# Patient Record
Sex: FEMALE | Race: WHITE | Employment: FULL TIME | URBAN - METROPOLITAN AREA
[De-identification: names, ages, dates, MRNs, and addresses within clinical notes are randomized per-mention and may not be internally consistent; named-entity substitution may affect disease eponyms.]

---

## 2017-08-17 ENCOUNTER — HOSPITAL ENCOUNTER (EMERGENCY)
Age: 27
Discharge: HOME | End: 2017-08-17
Attending: EMERGENCY MEDICINE
Payer: COMMERCIAL

## 2017-08-17 VITALS
SYSTOLIC BLOOD PRESSURE: 113 MMHG | BODY MASS INDEX: 31.89 KG/M2 | RESPIRATION RATE: 16 BRPM | HEART RATE: 112 BPM | DIASTOLIC BLOOD PRESSURE: 81 MMHG | TEMPERATURE: 97.9 F | HEIGHT: 66 IN | WEIGHT: 198.41 LBS | OXYGEN SATURATION: 97 %

## 2017-08-17 DIAGNOSIS — M79.89 SWELLING OF RIGHT HAND: ICD-10-CM

## 2017-08-17 DIAGNOSIS — L50.1 IDIOPATHIC URTICARIA: Primary | ICD-10-CM

## 2017-08-17 PROCEDURE — 74011250636 HC RX REV CODE- 250/636: Performed by: PHYSICIAN ASSISTANT

## 2017-08-17 PROCEDURE — 74011250637 HC RX REV CODE- 250/637: Performed by: PHYSICIAN ASSISTANT

## 2017-08-17 PROCEDURE — 96372 THER/PROPH/DIAG INJ SC/IM: CPT

## 2017-08-17 PROCEDURE — 99283 EMERGENCY DEPT VISIT LOW MDM: CPT

## 2017-08-17 RX ORDER — HYDROXYZINE PAMOATE 25 MG/1
25 CAPSULE ORAL
Qty: 20 CAP | Refills: 0 | OUTPATIENT
Start: 2017-08-17 | End: 2021-08-28

## 2017-08-17 RX ORDER — DIPHENHYDRAMINE HYDROCHLORIDE 50 MG/ML
50 INJECTION, SOLUTION INTRAMUSCULAR; INTRAVENOUS
Status: COMPLETED | OUTPATIENT
Start: 2017-08-17 | End: 2017-08-17

## 2017-08-17 RX ORDER — PREDNISONE 20 MG/1
20 TABLET ORAL
COMMUNITY
End: 2021-08-28

## 2017-08-17 RX ORDER — CETIRIZINE HCL 10 MG
10 TABLET ORAL
Qty: 30 TAB | Refills: 0 | OUTPATIENT
Start: 2017-08-17 | End: 2021-08-28

## 2017-08-17 RX ORDER — PHENTERMINE HYDROCHLORIDE 37.5 MG/1
37.5 TABLET ORAL
COMMUNITY
End: 2021-08-28

## 2017-08-17 RX ORDER — EPINEPHRINE 0.3 MG/.3ML
0.3 INJECTION SUBCUTANEOUS
COMMUNITY
End: 2021-08-28

## 2017-08-17 RX ORDER — BUPROPION HYDROCHLORIDE 150 MG/1
150 TABLET, EXTENDED RELEASE ORAL 2 TIMES DAILY
COMMUNITY
End: 2021-08-28

## 2017-08-17 RX ORDER — FAMOTIDINE 20 MG/1
20 TABLET, FILM COATED ORAL
Status: COMPLETED | OUTPATIENT
Start: 2017-08-17 | End: 2017-08-17

## 2017-08-17 RX ORDER — EPINEPHRINE 0.3 MG/.3ML
0.3 INJECTION SUBCUTANEOUS
Qty: 2 SYRINGE | Refills: 0 | OUTPATIENT
Start: 2017-08-17 | End: 2021-08-28

## 2017-08-17 RX ORDER — DIPHENHYDRAMINE HCL 25 MG
100 CAPSULE ORAL
COMMUNITY
End: 2021-08-28

## 2017-08-17 RX ORDER — HYDROXYZINE 25 MG/1
25 TABLET, FILM COATED ORAL
Status: COMPLETED | OUTPATIENT
Start: 2017-08-17 | End: 2017-08-17

## 2017-08-17 RX ADMIN — HYDROXYZINE HYDROCHLORIDE 25 MG: 25 TABLET ORAL at 15:58

## 2017-08-17 RX ADMIN — FAMOTIDINE 20 MG: 20 TABLET, FILM COATED ORAL at 15:58

## 2017-08-17 RX ADMIN — DIPHENHYDRAMINE HYDROCHLORIDE 50 MG: 50 INJECTION, SOLUTION INTRAMUSCULAR; INTRAVENOUS at 15:59

## 2017-08-17 NOTE — DISCHARGE INSTRUCTIONS
We hope that we have addressed all of your medical concerns. The examination and treatment you received in the Emergency Department were for an emergent problem and were not intended as complete care. It is important that you follow up with your healthcare provider(s) for ongoing care. If your symptoms worsen or do not improve as expected, and you are unable to reach your usual health care provider(s), you should return to the Emergency Department. Today's healthcare is undergoing tremendous change, and patient satisfaction surveys are one of the many tools to assess the quality of medical care. You may receive a survey from the [employer] regarding your experience in the Emergency Department. I hope that your experience has been completely positive, particularly the medical care that I provided. As such, please participate in the survey; anything less than excellent does not meet my expectations or intentions. [facility], [facility] and [address] [place] [address] participate in nationally recognized quality of care measures. If your blood pressure is greater than 120/80, as reported below, we urge that you seek medical care to address the potential of high blood pressure, commonly known as hypertension. Hypertension can be hereditary or can be caused by certain medical conditions, pain, stress, or \"white coat syndrome. \"       Please make an appointment with your health care provider(s) for follow up of your Emergency Department visit. VITALS:   Patient Vitals for the past 8 hrs:   Temp Pulse Resp BP SpO2   08/17/17 1524 97.9 °F (36.6 °C) (!) 112 16 119/71 100 %          Thank you for allowing us to provide you with medical care today. We realize that you have many choices for your emergency care needs. Please choose us in the future for any continued health care needs.       Regards,           Sofya Ratliff PA-C    [facility], [address] [facility]. Office: [phone]             Hives: Care Instructions  Your Care Instructions  Hives are raised, red, itchy patches of skin. They are also called wheals or welts. They usually have red borders and pale centers. Hives range in size from ¼ inch to 3 inches or more across. They may seem to move from place to place on the skin. Several hives may form a large area of raised, red skin. You can get hives after an insect sting, after taking medicine or eating certain foods, or because of infection or stress. Other causes include plants, things you breathe in, makeup, heat, cold, sunlight, and latex. You cannot spread hives to other people. Hives may last a few minutes or a few days, but a single spot may last less than 36 hours. Follow-up care is a key part of your treatment and safety. Be sure to make and go to all appointments, and call your doctor if you are having problems. It's also a good idea to know your test results and keep a list of the medicines you take. How can you care for yourself at home? · Avoid whatever you think may have caused your hives, such as a certain food or medicine. However, you may not know the cause. · Put a cool, wet towel on the area to relieve itching. · Take an over-the-counter antihistamine, such as diphenhydramine (Benadryl), cetirizine (Zyrtec), or loratadine (Claritin), to help stop the hives and calm the itching. Read and follow directions on the label. These medicines can make you feel sleepy. Do not drive while using them. · Stay away from strong soaps, detergents, and chemicals. These can make itching worse. When should you call for help? Call 911 anytime you think you may need emergency care. For example, call if:  · You have symptoms of a severe allergic reaction. These may include:  ¨ Sudden raised, red areas (hives) all over your body. ¨ Swelling of the throat, mouth, lips, or tongue. ¨ Trouble breathing. ¨ Passing out (losing consciousness).  Or you may feel very lightheaded or suddenly feel weak, confused, or restless. Call your doctor now or seek immediate medical care if:  · You have symptoms of an allergic reaction, such as:  ¨ A rash or hives (raised, red areas on the skin). ¨ Itching. ¨ Swelling. ¨ Belly pain, nausea, or vomiting. · You get hives after you start a new medicine. · Hives have not gone away after 24 hours. Watch closely for changes in your health, and be sure to contact your doctor if:  · You do not get better as expected. Where can you learn more? Go to http://get-aisha.info/. Enter V463 in the search box to learn more about \"Hives: Care Instructions. \"  Current as of: March 20, 2017  Content Version: 11.3  © 4251-1738 [employer]. Care instructions adapted under license by [employer] (which disclaims liability or warranty for this information). If you have questions about a medical condition or this instruction, always ask your healthcare professional. [place] [address] any warranty or liability for your use of this information.

## 2017-08-17 NOTE — ED TRIAGE NOTES
Patient presents with 2 day history of facial swelling, hand swelling, and hives. Seen at [facility] yesterday -- has not had any relief of symptoms from steroids and Benadryl.

## 2017-08-17 NOTE — ED PROVIDER NOTES
HPI Comments: Augusto Benites is a 32 y.o. female with no significant PMH presents to emergency room ambulatory with friend c/o urticaria x 3 days. She states on 8/15 in the evening her left upper lip and BL inner thighs were itchy and swollen and took 1 Benadryl and went to sleep. States the next morning the urticaria progressed to full body and she went to [facility] yesterday where they gave her Prednisone 60mg, and an Epi Pen while in the office and states the sx's subsided. She denies having any tongue swelling, airway involvement, wheezing, SOB. She took Prednisone 60mg this morning and has been taking Benadryl 50mg q4h, last dose was 1pm. She states her right hand began to swell at work today and so she decided to be evaluated. She takes phentermine 37.5mg BID which she has been taking for \"years\" and was started on Wellbutrin XL 150mg daily on 7/27, denies tick bites, insect bites, new soaps or detergents, new lotions/perfumes, bath soap. She further denies F/C, N/V/D, CP, SOB, cough. Denies pregnancy chance and states LMP was 8/11. She was also given a Rx for an Epi Pen from [facility] and Prednisone 60mg x 5 days. PCP: No primary care provider on file. Social hx- no tobacco, + ETOH; has a desk job    The patient has no other complaints at this time. Patient is a 32 y.o. female presenting with hand swelling and allergic reaction. The history is provided by the patient. Hand Swelling    Pertinent negatives include no numbness, no back pain and no neck pain. Allergic Reaction    Pertinent negatives include no nausea, no vomiting, no confusion and no shortness of breath. No past medical history on file. No past surgical history on file. No family history on file. Social History     Social History    Marital status: N/A     Spouse name: N/A    Number of children: N/A    Years of education: N/A     Occupational History    Not on file.      Social History Main Topics    Smoking status: Not on file    Smokeless tobacco: Not on file    Alcohol use Not on file    Drug use: Not on file    Sexual activity: Not on file     Other Topics Concern    Not on file     Social History Narrative         ALLERGIES: Pcn [penicillins]    Review of Systems   Constitutional: Negative. Negative for activity change, chills, fatigue and unexpected weight change. Respiratory: Negative for cough, chest tightness, shortness of breath and wheezing. Cardiovascular: Negative. Negative for chest pain and palpitations. Gastrointestinal: Negative. Negative for abdominal pain, diarrhea, nausea and vomiting. Genitourinary: Negative. Negative for dysuria, flank pain, frequency and hematuria. Musculoskeletal: Negative. Negative for arthralgias, back pain, neck pain and neck stiffness. Skin: Positive for rash. Negative for color change. Neurological: Negative. Negative for dizziness, numbness and headaches. Psychiatric/Behavioral: Negative. Negative for confusion. All other systems reviewed and are negative. Vitals:    08/17/17 1524   BP: 119/71   Pulse: (!) 112   Resp: 16   Temp: 97.9 °F (36.6 °C)   SpO2: 100%   Weight: 90 kg (198 lb 6.6 oz)   Height: 5' 6\" (1.676 m)            Physical Exam   Constitutional: She is oriented to person, place, and time. She appears well-developed and well-nourished. She is active. Non-toxic appearance. No distress. HENT:   Head: Normocephalic and atraumatic. Eyes: Conjunctivae are normal. Pupils are equal, round, and reactive to light. Right eye exhibits no discharge. Left eye exhibits no discharge. Mild soft tissue swelling to BL upper lips. Neck: Normal range of motion and full passive range of motion without pain. Neck supple. No tracheal tenderness present. Cardiovascular: Normal rate, regular rhythm, normal heart sounds, intact distal pulses and normal pulses. Exam reveals no gallop and no friction rub.     No murmur heard.  Pulmonary/Chest: Effort normal and breath sounds normal. No respiratory distress. She has no wheezes. She has no rales. She exhibits no tenderness. Abdominal: Soft. Bowel sounds are normal. She exhibits no distension. There is no tenderness. There is no rebound and no guarding. Musculoskeletal: Normal range of motion. She exhibits no edema or tenderness. Neurological: She is alert and oriented to person, place, and time. She has normal strength. No cranial nerve deficit or sensory deficit. Coordination normal.   Skin: Skin is warm, dry and intact. No abrasion and no rash noted. She is not diaphoretic. No erythema. R upper lip with mild soft tissue swelling. Blancheable urticaria to anterior, lateral torso bilaterally, sparing the back. Urticaria also noted to posterior thighs BL, and swelling to right hand. See attached photos. Psychiatric: She has a normal mood and affect. Her speech is normal and behavior is normal. Cognition and memory are normal.   Nursing note and vitals reviewed. MDM  Number of Diagnoses or Management Options  Diagnosis management comments:   Ddx: idiopathic urticaria, allergic reaction, anaphylaxis       Amount and/or Complexity of Data Reviewed  Review and summarize past medical records: yes    Patient Progress  Patient progress: stable    ED Course       Procedures      I discussed patient's PMH, exam findings as well as careplan with the ER attending who agrees with care plan. Dr. Sayda Cisneros also saw and examined patient. Tatum Arias PA-C     4:45pm  Patient re-assessed, still c/o R hand pain but otherwise feeling better. Ice applied to right hand and it is elevated. Still no airway involvement, voice still unchanged, normal.  Tatum Arias PA-C    5:15pm   Patient re-assessed. Urticaria lessened. Lungs still CTA, no airway or intraoral involvement.  Will have her continue Prednisone 60mg daily as instructed, also discussed indications for using the Epi Pen Rx'd by [facility]. Will also add hydroxyzine and Zyrtec and recommend close f/u with a primary care provider. Return precautions discussed and advised to return to ER if worse. Dhara Montalvo PA-C       DISCHARGE NOTE:  5:29 PM  The patient's results have been reviewed with them and/or available family. Patient and/or family verbally conveyed their understanding and agreement of the patient's signs, symptoms, diagnosis, treatment and prognosis and additionally agree to follow up as recommended in the discharge instructions or to return to the Emergency Room should their condition change prior to their follow-up appointment. The patient/family verbally agrees with the care-plan and verbally conveys that all of their questions have been answered. The discharge instructions have also been provided to the patient and/or family with some educational information regarding the patient's diagnosis as well a list of reasons why the patient would want to return to the ER prior to their follow-up appointment, should their condition change.

## 2017-08-23 ENCOUNTER — TELEPHONE (OUTPATIENT)
Dept: INTERNAL MEDICINE CLINIC | Age: 27
End: 2017-08-23

## 2017-08-23 NOTE — TELEPHONE ENCOUNTER
Reached out to patient x2 to schedule New Patient appointment to establish care. Contact phone number on file not in service at time of call.

## 2021-04-12 LAB
ANTIBODY SCREEN, EXTERNAL: NEGATIVE
CHLAMYDIA, EXTERNAL: NEGATIVE
HBSAG, EXTERNAL: NEGATIVE
HIV, EXTERNAL: NORMAL
N. GONORRHEA, EXTERNAL: NEGATIVE
RPR, EXTERNAL: NORMAL
RUBELLA, EXTERNAL: NORMAL
TYPE, ABO & RH, EXTERNAL: NORMAL

## 2021-08-17 ENCOUNTER — OFFICE VISIT (OUTPATIENT)
Dept: SURGERY | Age: 31
End: 2021-08-17
Payer: COMMERCIAL

## 2021-08-17 VITALS
WEIGHT: 232 LBS | HEIGHT: 65 IN | HEART RATE: 99 BPM | BODY MASS INDEX: 38.65 KG/M2 | SYSTOLIC BLOOD PRESSURE: 95 MMHG | DIASTOLIC BLOOD PRESSURE: 59 MMHG

## 2021-08-17 DIAGNOSIS — N64.9 LESION OF LEFT NIPPLE: Primary | ICD-10-CM

## 2021-08-17 PROCEDURE — 99241 PR OFFICE CONSULTATION NEW/ESTAB PATIENT 15 MIN: CPT | Performed by: SURGERY

## 2021-08-17 PROCEDURE — 10120 INC&RMVL FB SUBQ TISS SMPL: CPT | Performed by: SURGERY

## 2021-08-17 NOTE — PROGRESS NOTES
HISTORY OF PRESENT ILLNESS  Aida Dominique is a 27 y.o. female. HPI   NEW patient consult referred by Dr. Anahi Evangelista for a nipple ring that is stuck in her LEFT nipple. 3 weeks ago one end disappeared in the nipple. Pt is pregnant and she would like to have it removed. Pain 3/10. Some white discharge. Ob/Gyn was unable to get the piercing out this morning. Family History   Problem Relation Age of Onset    Cancer Maternal Grandmother     Cancer Paternal Grandfather     Cancer Cousin      Social History     Tobacco Use    Smoking status: Never Smoker    Smokeless tobacco: Never Used   Substance Use Topics    Alcohol use: Yes      Prior to Admission medications    Medication Sig Start Date End Date Taking? Authorizing Provider   prenatal vit-iron fumarate-fa 27 mg iron- 0.8 mg tab tablet Take 1 Tablet by mouth daily. Yes Provider, Historical   phentermine (ADIPEX-P) 37.5 mg tablet Take 37.5 mg by mouth every morning. Domonique Powell MD   buPROPion SR (WELLBUTRIN SR) 150 mg SR tablet Take 150 mg by mouth two (2) times a day. Domonique Powell MD   predniSONE (DELTASONE) 20 mg tablet Take 20 mg by mouth daily (with breakfast). Domonique Powell MD   diphenhydrAMINE (DIPHEDRYL) 25 mg capsule Take 100 mg by mouth every six (6) hours as needed. Domonique Powell MD   EPINEPHrine (EPIPEN) 0.3 mg/0.3 mL injection 0.3 mg by IntraMUSCular route once as needed. Domonique Powell MD   hydrOXYzine pamoate (VISTARIL) 25 mg capsule Take 1 Cap by mouth three (3) times daily as needed for Itching. 8/17/17   Manish Agustin PA-C   cetirizine (ZYRTEC) 10 mg tablet Take 1 Tab by mouth nightly. 8/17/17   Manish Agustin PA-C   EPINEPHrine (EPIPEN) 0.3 mg/0.3 mL injection 0.3 mL by IntraMUSCular route once as needed for up to 1 dose. 8/17/17   Manish Agustin PA-C      Allergies   Allergen Reactions    Pcn [Penicillins] Rash              Review of Systems   Cardiovascular: Positive for leg swelling.    Musculoskeletal: Positive for back pain and joint pain. Neurological: Positive for headaches. Psychiatric/Behavioral: Positive for depression. The patient is nervous/anxious and has insomnia. All other systems reviewed and are negative. Physical Exam  Chest:      Breasts: Breasts are symmetrical.         Right: No inverted nipple, mass, nipple discharge, skin change or tenderness. Left: No inverted nipple, mass, nipple discharge, skin change or tenderness. Comments: Lateral barbell is under the skin. Incision at the lateral edge  Lymphadenopathy:      Upper Body:      Right upper body: No supraclavicular or axillary adenopathy. Left upper body: No supraclavicular or axillary adenopathy. Procedure:  I attempted to removed the barbell piercing from the lateral side, but was unable to grasp it. I grasped the medial barbell with a needle  and was able to pull the piercing out from the medial aspect. Dressed with bacitracin and a telfa bandaid. ASSESSMENT and PLAN    ICD-10-CM ICD-9-CM    1. Lesion of left nipple  N64.9 611.9      Total time spent with patient: 20 minutes     LEFT nipple piercing removed  Discussed the possibility that breast feeding could be compromised due to the piercing. Pt will see how it goes.

## 2021-08-28 ENCOUNTER — HOSPITAL ENCOUNTER (EMERGENCY)
Age: 31
Discharge: HOME | End: 2021-08-28
Admitting: OBSTETRICS & GYNECOLOGY
Payer: COMMERCIAL

## 2021-08-28 VITALS
RESPIRATION RATE: 16 BRPM | TEMPERATURE: 98.5 F | SYSTOLIC BLOOD PRESSURE: 121 MMHG | DIASTOLIC BLOOD PRESSURE: 74 MMHG | HEART RATE: 102 BPM

## 2021-08-28 DIAGNOSIS — Z3A.27 27 WEEKS GESTATION OF PREGNANCY: Primary | ICD-10-CM

## 2021-08-28 DIAGNOSIS — R10.9 ABDOMINAL PAIN AFFECTING PREGNANCY: ICD-10-CM

## 2021-08-28 DIAGNOSIS — O26.899 ABDOMINAL PAIN AFFECTING PREGNANCY: ICD-10-CM

## 2021-08-28 LAB
APPEARANCE UR: CLEAR
BACTERIA URNS QL MICRO: NEGATIVE /HPF
BILIRUB UR QL: NEGATIVE
CLUE CELLS VAG QL WET PREP: NORMAL
COLOR UR: NORMAL
EPITH CASTS URNS QL MICRO: NORMAL /LPF
GLUCOSE UR STRIP.AUTO-MCNC: NEGATIVE MG/DL
HGB UR QL STRIP: NEGATIVE
HYALINE CASTS URNS QL MICRO: NORMAL /LPF (ref 0–5)
KETONES UR QL STRIP.AUTO: NEGATIVE MG/DL
KOH PREP SPEC: NORMAL
LEUKOCYTE ESTERASE UR QL STRIP.AUTO: NEGATIVE
NITRITE UR QL STRIP.AUTO: NEGATIVE
PH UR STRIP: 6 [PH] (ref 5–8)
PROT UR STRIP-MCNC: NEGATIVE MG/DL
RBC #/AREA URNS HPF: NORMAL /HPF (ref 0–5)
SERVICE CMNT-IMP: NORMAL
SP GR UR REFRACTOMETRY: 1.01 (ref 1–1.03)
T VAGINALIS VAG QL WET PREP: NORMAL
UA: UC IF INDICATED,UAUC: NORMAL
UROBILINOGEN UR QL STRIP.AUTO: 0.2 EU/DL (ref 0.2–1)
WBC URNS QL MICRO: NORMAL /HPF (ref 0–4)

## 2021-08-28 PROCEDURE — 99283 EMERGENCY DEPT VISIT LOW MDM: CPT

## 2021-08-28 PROCEDURE — 81001 URINALYSIS AUTO W/SCOPE: CPT

## 2021-08-28 PROCEDURE — 87210 SMEAR WET MOUNT SALINE/INK: CPT

## 2021-08-28 RX ORDER — METRONIDAZOLE 500 MG/1
500 TABLET ORAL 2 TIMES DAILY
Qty: 14 TABLET | Refills: 0 | Status: SHIPPED | OUTPATIENT
Start: 2021-08-28 | End: 2021-09-04

## 2021-08-29 NOTE — DISCHARGE INSTRUCTIONS
Patient Education        Weeks 26 to 30 of Your Pregnancy: Care Instructions  Overview     You are now entering your last trimester of pregnancy. Your baby is growing quickly. Jose Nielsen probably feel your baby moving around more often. Your doctor may ask you to count your baby's kicks. Your back may ache as your body gets used to your baby's size and length. If you haven't already had the Tdap shot during this pregnancy, talk to your doctor about getting it. It will help protect your  against pertussis infection. During this time, it's important to take care of yourself and pay attention to what your body needs. If you feel sexual, you can explore ways to be close with your partner that match your comfort and desire. Follow-up care is a key part of your treatment and safety. Be sure to make and go to all appointments, and call your doctor if you are having problems. It's also a good idea to know your test results and keep a list of the medicines you take. How can you care for yourself at home? Take it easy at work  · Take frequent breaks. If possible, stop working when you are tired, and rest during your lunch hour. · Take bathroom breaks every 2 hours. · Change positions often. If you sit for long periods, stand up and walk around. · When you stand for a long time, keep one foot on a low stool with your knee bent. After standing a lot, sit with your feet up. · Avoid fumes, chemicals, and tobacco smoke. Be sexual in your own way  · Having sex during pregnancy is okay, unless your doctor tells you not to. · You may be very interested in sex, or you may have no interest at all. · Your growing belly can make it hard to find a good position during intercourse. Castine and explore. · You may get cramps in your uterus when your partner touches your breasts. · A back rub may relieve the backache or cramps that sometimes follow orgasm. Learn about  labor  · Watch for signs of  labor. You may be going into labor if:  ? You have menstrual-like cramps, with or without nausea. ? You have about 6 or more contractions in 1 hour, even after you have had a glass of water and are resting. ? You have a low, dull backache that does not go away when you change your position. ? You have pain or pressure in your pelvis that comes and goes in a pattern. ? You have intestinal cramping or flu-like symptoms, with or without diarrhea.  ? You notice an increase or change in your vaginal discharge. Discharge may be heavy, mucus-like, watery, or streaked with blood. ? Your water breaks. · If you think you have  labor:  ? Drink 2 or 3 glasses of water or juice. Not drinking enough fluids can cause contractions. ? Stop what you are doing, and empty your bladder. Then lie down on your left side for at least 1 hour. ? While lying on your side, find your breast bone. Put your fingers in the soft spot just below it. Move your fingers down toward your belly button to find the top of your uterus. Check to see if it is tight. ? Contractions can be weak or strong. Record your contractions for an hour. Time a contraction from the start of one contraction to the start of the next one.  ? Single or several strong contractions without a pattern are called Tom-Nguyen contractions. They are practice contractions but not the start of labor. They often stop if you change what you are doing. ? Call your doctor if you have regular contractions. Where can you learn more? Go to http://www.gray.com/  Enter P542 in the search box to learn more about \"Weeks 26 to 30 of Your Pregnancy: Care Instructions. \"  Current as of: 2020               Content Version: 12.8  © 4538-1611 [employer]. Care instructions adapted under license by [employer] (which disclaims liability or warranty for this information).  If you have questions about a medical condition or this instruction, always ask your healthcare professional. [place] [address] any warranty or liability for your use of this information.

## 2021-08-29 NOTE — ED TRIAGE NOTES
2210 Patient arrived in [facility] complaining of contractions she stated were every 4 minutes apart, starting at 7 pm. Placed on monitors, history obtained. CNM notified patient ready to be seen    [phone] CNM at bedside to evaluate patient. SVE closed/thick/high. UA and cultures sent. Patient resting comfortably at this time. [address] at bedside, reassessed cervix, unchanged. Patient educated by CNM on antibiotics and to be discharged at this time. Patient verbalized understanding of education provided and agrees with plan. 6924 Discharge instructions given to the patient, patient understands and will follow up with MD for scheduled appointment this week. Patient discharged at this time.

## 2021-08-29 NOTE — ED PROVIDER NOTES
ANNI History and Physical    Patient is a 27 y.o.  at 27w1d with lei 21 who presents to the [facility] with c/o abdominal pain \"tightening\" at 2210. She reports it started around 1930 this evening and they are about q5 minutes. Rates them 5/10. Appears comfortable in bed and smiling. Reports she was outside standing up at a baby shower all day. Reports she did drink a lot of water today but was on her feet all day. She reports good FM. Denies VB, LOF, n/v/d, fever, cough, sore throat, SOB/difficulty breathing, loss of taste/smell, exposure to anyone with COVID, h/a, changes in vision, RUQ/epigastric pain. Also reports intercourse this morning. She reports prenatal care with Dr. 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 No pertinent surgical history. Family History:   Problem Relation Age of Onset    Cancer Maternal Grandmother     Cancer Paternal Grandfather     Cancer Cousin        Social History     Socioeconomic History    Marital status: SINGLE     Spouse name: Not on file    Number of children: Not on file    Years of education: Not on file    Highest education level: Not on file   Occupational History    Not on file   Tobacco Use    Smoking status: Never Smoker    Smokeless tobacco: Never Used   Vaping Use    Vaping Use: Never used   Substance and Sexual Activity    Alcohol use:  Yes    Drug use: Never    Sexual activity: Not on file   Other Topics Concern     Service Not Asked    Blood Transfusions Not Asked    Caffeine Concern Not Asked    Occupational Exposure Not Asked    Hobby Hazards Not Asked    Sleep Concern Not Asked    Stress Concern Not Asked    Weight Concern Not Asked    Special Diet Not Asked    Back Care Not Asked    Exercise Not Asked    Bike Helmet Not Asked  Seat Belt Not Asked    Self-Exams Not Asked   Social History Narrative    Not on file     Social Determinants of Health     Financial Resource Strain:     Difficulty of Paying Living Expenses:    Food Insecurity:     Worried About Running Out of Food in the Last Year:     [address] in the Last Year:    Transportation Needs:     Lack of Transportation (Medical):  Lack of Transportation (Non-Medical):    Physical Activity:     Days of Exercise per Week:     Minutes of Exercise per Session:    Stress:     Feeling of Stress :    Social Connections:     Frequency of Communication with Friends and Family:     Frequency of Social Gatherings with Friends and Family:     Attends Sikh Services:     Active Member of Clubs or Organizations:     Attends Club or Organization Meetings:     Marital Status:    Intimate Partner Violence:     Fear of Current or Ex-Partner:     Emotionally Abused:     Physically Abused:     Sexually Abused: ALLERGIES: Pcn [penicillins]    Review of Systems   Constitutional: Negative for chills and fever. Eyes: Negative for visual disturbance. Respiratory: Negative for cough and shortness of breath. Cardiovascular: Negative for chest pain and leg swelling. Gastrointestinal: Positive for abdominal pain. Negative for diarrhea, nausea and vomiting. Tightening   Genitourinary: Negative for vaginal bleeding and vaginal discharge. Musculoskeletal: Negative for gait problem. Neurological: Negative for speech difficulty and headaches. All other systems reviewed and are negative. Vitals:    08/28/21 2221   BP: 121/74   Pulse: (!) 102   Resp: 16   Temp: 98.5 °F (36.9 °C)            Physical Exam  Vitals and nursing note reviewed. Exam conducted with a chaperone present. Constitutional:       General: She is awake. She is not in acute distress. Appearance: Normal appearance. She is well-developed, well-groomed and normal weight. Comments: Appears comfortable, smiling, talking, no apparent contractions noted while in room with patient   HENT:      Head: Normocephalic. Nose: Nose normal.   Cardiovascular:      Rate and Rhythm: Normal rate and regular rhythm. Pulses: Normal pulses. Pulmonary:      Effort: Pulmonary effort is normal. No respiratory distress. Comments: Breathing easy and unlabored  Abdominal:      Tenderness: There is no abdominal tenderness. Comments: Gravid c/w 27w  FHTs: 130s, +10x10, neg decels, moderate variability  [place]: ? One when Belynda Augusta first applied but patient moving- none palpated   Genitourinary:     General: Normal vulva. Exam position: Supine. Labia:         Right: No lesion. Left: No lesion. Vagina: Normal.      Cervix: Dilated. Uterus: Normal. Enlarged. Rectum: Normal.      Comments: SSE: visually closed, no VB/LOF  SVE closed/long/high/posterior  Membranes intact  No lesions  Musculoskeletal:         General: Normal range of motion. Cervical back: Normal range of motion. Right lower le+ Edema present. Left lower le+ Edema present. Skin:     General: Skin is warm and dry. Neurological:      Mental Status: She is alert and oriented to person, place, and time. Gait: Gait is intact. Psychiatric:         Mood and Affect: Mood normal.         Speech: Speech normal.         Behavior: Behavior normal. Behavior is cooperative. Thought Content:  Thought content normal.         Cognition and Memory: Cognition and memory normal.         Judgment: Judgment normal.          MDM  Number of Diagnoses or Management Options  27 weeks gestation of pregnancy: established and improving  Abdominal pain affecting pregnancy: new and requires workup     Amount and/or Complexity of Data Reviewed  Clinical lab tests: ordered and reviewed  Tests in the medicine section of CPT®: ordered and reviewed  Independent visualization of images, tracings, or specimens: yes (NST)    Risk of Complications, Morbidity, and/or Mortality  Presenting problems: moderate  Diagnostic procedures: moderate  Management options: moderate    Critical Care  Total time providing critical care: < 30 minutes    Patient Progress  Patient progress: stable    ED Course as of Aug 28 2350   Sat Aug 28, 2021   2230 C/o abdominal tightening q5 minutes. Good FM. No VB/LOF. SVE closed/long/high/posterior. Send urinalysis with reflex. Wet prep/KOH. PO hydrate    [SB]    KOH, OTHER SOURCES:    Special Requests: NO SPECIAL REQUESTS   KOH NO YEAST SEEN [SB]    WET PREP:    Clue cells CLUE CELLS PRESENT   Wet prep NO TRICHOMONAS SEEN [SB]    URINALYSIS W/ REFLEX CULTURE:    Color YELLOW/STRAW   Appearance CLEAR   Specific gravity 1.010   pH (UA) 6.0   Protein Negative   Glucose Negative   Ketone Negative   Bilirubin Negative   Blood Negative   Urobilinogen 0.2   Nitrites Negative   Leukocyte Esterase Negative   UA:UC IF INDICATED CULTURE NOT INDICATED BY UA RESULT   WBC 0-4   RBC 0-5   Epithelial cells FEW   Bacteria Negative   Hyaline cast 0-2 [SB]      ED Course User Index  [SB] Braxton Patient A, CNM       Procedures  NST, SSE, SVE    Impression:  at 27w1d IUP  Abdominal pain in pregnancy  Cat 1 tracing  Obesity      Plan:  Admit to [facility] for eval  Send urinalysis with reflex  Wet prep/KOH  Recheck prior to d/c  Reviewed plan with patient/partner, all questions asked/answered and they agree with plan    Update 2330:  S: Patient appears comfortable, resting in bed. Reports she feels like she has had 3 contractions/tightening since we last checked her, they feel less intense.    O: SVE closed/long/high/posterior  Recent Results (from the past 12 hour(s))   WET PREP    Collection Time: 21 10:45 PM    Specimen: Miscellaneous sample   Result Value Ref Range    Clue cells CLUE CELLS PRESENT      Wet prep NO TRICHOMONAS SEEN     KOH, OTHER SOURCES    Collection Time: 21 10:45 PM    Specimen: Vaginal Specimen;  Other   Result Value Ref Range    Special Requests: NO SPECIAL REQUESTS      KOH NO YEAST SEEN     URINALYSIS W/ REFLEX CULTURE    Collection Time: 21 10:45 PM    Specimen: Urine   Result Value Ref Range    Color YELLOW/STRAW      Appearance CLEAR CLEAR      Specific gravity 1.010 1.003 - 1.030      pH (UA) 6.0 5.0 - 8.0      Protein Negative NEG mg/dL    Glucose Negative NEG mg/dL    Ketone Negative NEG mg/dL    Bilirubin Negative NEG      Blood Negative NEG      Urobilinogen 0.2 0.2 - 1.0 EU/dL    Nitrites Negative NEG      Leukocyte Esterase Negative NEG      UA:UC IF INDICATED CULTURE NOT INDICATED BY UA RESULT      WBC 0-4 0 - 4 /hpf    RBC 0-5 0 - 5 /hpf    Epithelial cells FEW FEW /lpf    Bacteria Negative NEG /hpf    Hyaline cast 0-2 0 - 5 /lpf     A: G1 at 27w1d IUP  False  labor  Bacterial vaginosis    P: D/c home  Reviewed  labor s/s, FKCs, warning s/s  Advised to call/return for worsening s/s  Encouraged PO hydration, rest  Rx Flagyl 500mg PO BID 7 days- reviewed with patient  Follow up at regular appointment this Tuesday/prn concerns  All questions asked/answered and patient/partner agree with plan

## 2021-09-07 LAB
ANTIBODY SCREEN, EXTERNAL: NEGATIVE
T. PALLIDUM, EXTERNAL: NORMAL

## 2021-11-03 LAB — GRBS, EXTERNAL: NEGATIVE

## 2021-11-30 ENCOUNTER — HOSPITAL ENCOUNTER (EMERGENCY)
Age: 31
Discharge: HOME | End: 2021-11-30
Payer: COMMERCIAL

## 2021-11-30 ENCOUNTER — ANESTHESIA EVENT (OUTPATIENT)
Dept: LABOR AND DELIVERY | Age: 31
End: 2021-11-30
Payer: COMMERCIAL

## 2021-11-30 ENCOUNTER — HOSPITAL ENCOUNTER (INPATIENT)
Age: 31
LOS: 3 days | Discharge: HOME | End: 2021-12-03
Attending: OBSTETRICS & GYNECOLOGY | Admitting: OBSTETRICS & GYNECOLOGY
Payer: COMMERCIAL

## 2021-11-30 ENCOUNTER — ANESTHESIA (OUTPATIENT)
Dept: LABOR AND DELIVERY | Age: 31
End: 2021-11-30
Payer: COMMERCIAL

## 2021-11-30 VITALS
DIASTOLIC BLOOD PRESSURE: 79 MMHG | BODY MASS INDEX: 44.32 KG/M2 | WEIGHT: 266 LBS | RESPIRATION RATE: 18 BRPM | TEMPERATURE: 98.1 F | HEART RATE: 85 BPM | SYSTOLIC BLOOD PRESSURE: 121 MMHG | HEIGHT: 65 IN

## 2021-11-30 DIAGNOSIS — Z3A.40 40 WEEKS GESTATION OF PREGNANCY: Primary | ICD-10-CM

## 2021-11-30 DIAGNOSIS — O47.1 FALSE LABOR AT OR AFTER 37 COMPLETED WEEKS OF GESTATION, ANTEPARTUM: ICD-10-CM

## 2021-11-30 DIAGNOSIS — R10.9 ABDOMINAL PAIN AFFECTING PREGNANCY: ICD-10-CM

## 2021-11-30 DIAGNOSIS — O26.899 ABDOMINAL PAIN AFFECTING PREGNANCY: ICD-10-CM

## 2021-11-30 PROBLEM — O47.9 UTERINE CONTRACTIONS DURING PREGNANCY: Status: ACTIVE | Noted: 2021-11-30

## 2021-11-30 LAB
BASOPHILS # BLD: 0 K/UL (ref 0–0.1)
BASOPHILS NFR BLD: 0 % (ref 0–1)
COVID-19 RAPID TEST, COVR: NOT DETECTED
DIFFERENTIAL METHOD BLD: ABNORMAL
EOSINOPHIL # BLD: 0.1 K/UL (ref 0–0.4)
EOSINOPHIL NFR BLD: 0 % (ref 0–7)
ERYTHROCYTE [DISTWIDTH] IN BLOOD BY AUTOMATED COUNT: 14.7 % (ref 11.5–14.5)
HCT VFR BLD AUTO: 40.3 % (ref 35–47)
HGB BLD-MCNC: 13.3 G/DL (ref 11.5–16)
IMM GRANULOCYTES # BLD AUTO: 0.1 K/UL (ref 0–0.04)
IMM GRANULOCYTES NFR BLD AUTO: 1 % (ref 0–0.5)
LYMPHOCYTES # BLD: 1.7 K/UL (ref 0.8–3.5)
LYMPHOCYTES NFR BLD: 12 % (ref 12–49)
MCH RBC QN AUTO: 28.9 PG (ref 26–34)
MCHC RBC AUTO-ENTMCNC: 33 G/DL (ref 30–36.5)
MCV RBC AUTO: 87.6 FL (ref 80–99)
MONOCYTES # BLD: 0.6 K/UL (ref 0–1)
MONOCYTES NFR BLD: 5 % (ref 5–13)
NEUTS SEG # BLD: 11.1 K/UL (ref 1.8–8)
NEUTS SEG NFR BLD: 82 % (ref 32–75)
NRBC # BLD: 0 K/UL (ref 0–0.01)
NRBC BLD-RTO: 0 PER 100 WBC
PLATELET # BLD AUTO: 140 K/UL (ref 150–400)
RBC # BLD AUTO: 4.6 M/UL (ref 3.8–5.2)
SOURCE, COVRS: NORMAL
WBC # BLD AUTO: 13.6 K/UL (ref 3.6–11)

## 2021-11-30 PROCEDURE — 65270000029 HC RM PRIVATE

## 2021-11-30 PROCEDURE — 74011000250 HC RX REV CODE- 250: Performed by: ANESTHESIOLOGY

## 2021-11-30 PROCEDURE — 85025 COMPLETE CBC W/AUTO DIFF WBC: CPT

## 2021-11-30 PROCEDURE — 74011250636 HC RX REV CODE- 250/636: Performed by: OBSTETRICS & GYNECOLOGY

## 2021-11-30 PROCEDURE — 75410000002 HC LABOR FEE PER 1 HR

## 2021-11-30 PROCEDURE — 99283 EMERGENCY DEPT VISIT LOW MDM: CPT

## 2021-11-30 PROCEDURE — 87635 SARS-COV-2 COVID-19 AMP PRB: CPT

## 2021-11-30 RX ORDER — SODIUM CHLORIDE, SODIUM LACTATE, POTASSIUM CHLORIDE, CALCIUM CHLORIDE 600; 310; 30; 20 MG/100ML; MG/100ML; MG/100ML; MG/100ML
125 INJECTION, SOLUTION INTRAVENOUS CONTINUOUS
Status: DISCONTINUED | OUTPATIENT
Start: 2021-11-30 | End: 2021-12-01

## 2021-11-30 RX ORDER — BUPIVACAINE HYDROCHLORIDE 2.5 MG/ML
15 INJECTION, SOLUTION EPIDURAL; INFILTRATION; INTRACAUDAL ONCE
Status: DISPENSED | OUTPATIENT
Start: 2021-11-30 | End: 2021-12-01

## 2021-11-30 RX ORDER — FENTANYL CITRATE 50 UG/ML
100 INJECTION, SOLUTION INTRAMUSCULAR; INTRAVENOUS ONCE
Status: DISPENSED | OUTPATIENT
Start: 2021-11-30 | End: 2021-12-01

## 2021-11-30 RX ORDER — ONDANSETRON 2 MG/ML
4 INJECTION INTRAMUSCULAR; INTRAVENOUS
Status: DISCONTINUED | OUTPATIENT
Start: 2021-11-30 | End: 2021-12-01

## 2021-11-30 RX ORDER — SODIUM CHLORIDE 0.9 % (FLUSH) 0.9 %
5-40 SYRINGE (ML) INJECTION EVERY 8 HOURS
Status: DISCONTINUED | OUTPATIENT
Start: 2021-11-30 | End: 2021-12-01

## 2021-11-30 RX ORDER — EPHEDRINE SULFATE/0.9% NACL/PF 50 MG/5 ML
12.5 SYRINGE (ML) INTRAVENOUS
Status: DISCONTINUED | OUTPATIENT
Start: 2021-11-30 | End: 2021-12-01

## 2021-11-30 RX ORDER — NALOXONE HYDROCHLORIDE 0.4 MG/ML
0.4 INJECTION, SOLUTION INTRAMUSCULAR; INTRAVENOUS; SUBCUTANEOUS AS NEEDED
Status: DISCONTINUED | OUTPATIENT
Start: 2021-11-30 | End: 2021-12-01

## 2021-11-30 RX ORDER — SODIUM CHLORIDE 0.9 % (FLUSH) 0.9 %
5-40 SYRINGE (ML) INJECTION AS NEEDED
Status: DISCONTINUED | OUTPATIENT
Start: 2021-11-30 | End: 2021-12-01

## 2021-11-30 RX ORDER — FENTANYL/BUPIVACAINE/NS/PF 2-1250MCG
1-16 PREFILLED PUMP RESERVOIR EPIDURAL CONTINUOUS
Status: DISCONTINUED | OUTPATIENT
Start: 2021-11-30 | End: 2021-12-01

## 2021-11-30 RX ORDER — BUTORPHANOL TARTRATE 1 MG/ML
1 INJECTION INTRAMUSCULAR; INTRAVENOUS
Status: DISCONTINUED | OUTPATIENT
Start: 2021-11-30 | End: 2021-12-01

## 2021-11-30 RX ORDER — BUPIVACAINE HYDROCHLORIDE 2.5 MG/ML
INJECTION, SOLUTION EPIDURAL; INFILTRATION; INTRACAUDAL AS NEEDED
Status: DISCONTINUED | OUTPATIENT
Start: 2021-11-30 | End: 2021-12-01 | Stop reason: HOSPADM

## 2021-11-30 RX ORDER — OXYTOCIN/RINGER'S LACTATE 30/500 ML
10 PLASTIC BAG, INJECTION (ML) INTRAVENOUS AS NEEDED
Status: DISCONTINUED | OUTPATIENT
Start: 2021-11-30 | End: 2021-12-01 | Stop reason: SDUPTHER

## 2021-11-30 RX ORDER — PYRIDOXINE HCL (VITAMIN B6) 25 MG
25 TABLET ORAL DAILY
COMMUNITY
End: 2021-12-03

## 2021-11-30 RX ORDER — ZOLPIDEM TARTRATE 5 MG/1
5 TABLET ORAL
Status: DISCONTINUED | OUTPATIENT
Start: 2021-11-30 | End: 2021-12-01

## 2021-11-30 RX ORDER — OXYTOCIN/RINGER'S LACTATE 30/500 ML
87.3 PLASTIC BAG, INJECTION (ML) INTRAVENOUS AS NEEDED
Status: DISCONTINUED | OUTPATIENT
Start: 2021-11-30 | End: 2021-12-01 | Stop reason: SDUPTHER

## 2021-11-30 RX ORDER — OXYTOCIN/RINGER'S LACTATE 30/500 ML
1-25 PLASTIC BAG, INJECTION (ML) INTRAVENOUS
Status: DISCONTINUED | OUTPATIENT
Start: 2021-11-30 | End: 2021-12-01

## 2021-11-30 RX ADMIN — SODIUM CHLORIDE, POTASSIUM CHLORIDE, SODIUM LACTATE AND CALCIUM CHLORIDE 125 ML/HR: 600; 310; 30; 20 INJECTION, SOLUTION INTRAVENOUS at 21:45

## 2021-11-30 RX ADMIN — BUPIVACAINE HYDROCHLORIDE 2 ML: 2.5 INJECTION, SOLUTION EPIDURAL; INFILTRATION; INTRACAUDAL; PERINEURAL at 21:38

## 2021-11-30 RX ADMIN — BUTORPHANOL TARTRATE 1 MG: 1 INJECTION, SOLUTION INTRAMUSCULAR; INTRAVENOUS at 18:53

## 2021-11-30 RX ADMIN — BUPIVACAINE HYDROCHLORIDE 5 ML: 2.5 INJECTION, SOLUTION EPIDURAL; INFILTRATION; INTRACAUDAL; PERINEURAL at 21:43

## 2021-11-30 RX ADMIN — OXYTOCIN 1 MILLI-UNITS/MIN: 10 INJECTION INTRAVENOUS at 22:47

## 2021-11-30 RX ADMIN — BUPIVACAINE HYDROCHLORIDE 5 ML: 2.5 INJECTION, SOLUTION EPIDURAL; INFILTRATION; INTRACAUDAL; PERINEURAL at 21:41

## 2021-11-30 RX ADMIN — PROMETHAZINE HYDROCHLORIDE 12.5 MG: 25 INJECTION INTRAMUSCULAR; INTRAVENOUS at 18:51

## 2021-11-30 RX ADMIN — SODIUM CHLORIDE, POTASSIUM CHLORIDE, SODIUM LACTATE AND CALCIUM CHLORIDE 800 ML: 600; 310; 30; 20 INJECTION, SOLUTION INTRAVENOUS at 21:00

## 2021-11-30 RX ADMIN — Medication 10 ML/HR: at 21:54

## 2021-11-30 RX ADMIN — SODIUM CHLORIDE, POTASSIUM CHLORIDE, SODIUM LACTATE AND CALCIUM CHLORIDE 125 ML/HR: 600; 310; 30; 20 INJECTION, SOLUTION INTRAVENOUS at 18:50

## 2021-11-30 NOTE — PROGRESS NOTES
~0012: The patient arrived ambulatory from home with reports of contractions that started yesterday morning at 0500. The patient reports some vaginal spotting but denies leaking of fluid. The patient reports active fetal movement. The patient and her  are escorted to ANNI 3.  ~0016: Joseph Hassan is called and made aware of the patient's arrival.  Brief SBAR report is given.  ~0021: Joseph Hassan is at the bedside speaking with the patient. SVE done; unchanged from the office (1-1.5cm/60/-2). Verbal order received to discharge the patient after a reactive NST.  ~0053: Verbal and a copy of the discharge instructions for rule out labor are given to the patient. The patient verbalizes understanding. All belongings are collected and the patient is discharged home ambulatory with her .

## 2021-11-30 NOTE — H&P
History & Physical    Name: David Gresham MRN: [id number]  SSN: [id number]    YOB: 1990  Age: 32 y.o. Sex: female        Subjective:     Estimated Date of Delivery: 21  OB History    Para Term  AB Living   1             SAB IAB Ectopic Molar Multiple Live Births                    # Outcome Date GA Lbr Darron/2nd Weight Sex Delivery Anes PTL Lv   1 Current                Ms. Luigi Mtz is admitted with pregnancy at 40w4d for early labor. Pt notes contractions have gotten progressively stronger throughout the day. She denies leakage of fluid or vaginal bleeding. She notes active FM. Prenatal course was notable for:  Rubella NI  Anxiety/depression- no meds  Hx abnormal pap- Cryo   Obesity      Please see prenatal records for details. 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; cryo     ADHD     Migraines     Psychiatric disorder     depressive disorder and anxiety     Past Surgical History:   Procedure Laterality Date    HX OTHER SURGICAL  2008    wisdom teeth removed     Social History     Occupational History    Not on file   Tobacco Use    Smoking status: Never Smoker    Smokeless tobacco: Never Used   Vaping Use    Vaping Use: Never used   Substance and Sexual Activity    Alcohol use: Not Currently    Drug use: Never    Sexual activity: Not on file     Family History   Problem Relation Age of Onset    Cancer Maternal Grandmother     Cancer Paternal Grandfather     Cancer Cousin      Allergies   Allergen Reactions    Pcn [Penicillins] Rash     Prior to Admission medications    Medication Sig Start Date End Date Taking? Authorizing Provider   prenatal vit-iron fumarate-fa 27 mg iron- 0.8 mg tab tablet Take 1 Tablet by mouth daily. Yes Provider, Historical   pyridoxine, vitamin B6, (Vitamin B-6) 25 mg tablet Take 25 mg by mouth daily.   Patient not taking: Reported on 2021    Provider, Historical        Review of Systems   Negative other than noted in HPI    Objective:     Vitals:  Vitals:    11/30/21 1632   Weight: 120.7 kg (266 lb)   Height: 5' 5\" (1.651 m)        Physical Exam  Gen: NAD  Resp: non-labored  CV: well perfused  Abd: soft, NT, gravid  Ext: b/l edema noted, equal, 2+ pitting    EFW 8-8.5#    Cervical Exam: 3cm/90%/-2    FHTs:  Baseline 150, moderate variability, + accels, no decels - REACTIVE    [place]: contractions every 2-3 minutes      Prenatal Labs:   No results found for: ABORH, RUBELLAEXT, GRBSEXT, HBSAGEXT, HIVEXT, RPREXT, GONNOEXT, CHLAMEXT, ABORHEXT, RUBELLAEXT, GRBSEXT, HBSAGEXT, HIVEXT, RPREXT, GONNOEXT, CHLAMEXT       Impression/Plan:     Active Problems:    Uterine contractions during pregnancy (11/30/2021)     G1 40 4/7 in early labor, uncomfortable desiring pain management    Plan:   Admit for labor. Group B Strep was negative. COVID vaccinated, rapid test pending.   Will offer pain control and augmentation if desired  Eliza Bethea MD

## 2021-11-30 NOTE — PROGRESS NOTES
1622 - Pt to LDR Rm 5, accompanied by  Nicole Mason) and her mother Zoe Smith). Pt was called in to L&D, for early labor, after being seen in office this am (2/80/-3) and having membranes swept. 1710 - Provided education on birth ball and benefits of ambulation, getting out of bed while off monitors. [phone] - Dr Ness Del Rosario at bedside to assess pt, perform SVE (3/90/-2) and discuss plan of care/options. Pt verbalizes understanding. May eat a light dinner first, then try Stadol and Phenergan to rest through uncomfortable UCs.    1855 - Stadol and Phenergan given IV.    1940 - Bedside and Verbal shift change report given to DANO Neri RN (oncoming nurse) by Belgica Sheehan RNC (offgoing nurse). Report included the following information SBAR, Kardex, Intake/Output, MAR, Accordion, Recent Results and Med Rec Status.

## 2021-11-30 NOTE — DISCHARGE INSTRUCTIONS
Introduction:  You came to the hospital because you thought you were in labor. This information may help you decide when you need to call your care provider and return to the hospital.  AM I IN LABOR?-  While each woman may feel contractions differently, these facts may help you determine if you are in true or active labor. A contraction is a painful tightening of the uterus. It may feel like the baby balling up, a bad backache or severe menstrual cramps. Sometimes women have contractions that do not cause cervical change-this is often called \"false labor\". In TRUE LABOR Contractions:  -  Occur regularly every five minutes or less  -  Feel stronger and hurt more  -  Become stronger with walking  -  Pinkish mucus or spotting may be present    In FALSE LABOR Contractions:  -  Occur irregularly      - Stay the same or space out  -  Strength stays the same or they hurt less when walking  Patient Education        Week 36 of Your Pregnancy: Care Instructions  Overview     By week 36, you have reached your due date. Your baby could be coming any day. But it's a good idea to think ahead to the next few weeks and what might happen. If this is your first time having a baby, try not to worry. If you don't start labor on your own by 41 or 42 weeks, your doctor may recommend giving you medicines to start labor. Talk to your doctor or midwife about breathing exercises you can do if you start to feel anxious or if you're trying to relax. Follow-up care is a key part of your treatment and safety. Be sure to make and go to all appointments, and call your doctor if you are having problems. It's also a good idea to know your test results and keep a list of the medicines you take. How can you care for yourself at home? Learn how labor can be started  · If you and your baby are both healthy and ready, and if your cervix has started to open, your doctor may \"break your water\" (rupture the amniotic sac).  This often starts labor.  · If your cervix is not quite ready, you may get a medicine called Pitocin through an IV to start contractions. · If your cervix is still very firm, you may have prostaglandin tablets (misoprostol) placed in your vagina to soften the cervix. Try guided imagery to help you relax  · Find a comfortable place to sit or lie down. Close your eyes. · Start by just taking a few deep breaths to help you relax. · Picture a setting that is calm and peaceful. This could be a beach, a mountain setting, a meadow, or a scene that you choose. · Imagine your scene, and try to add some detail. For example, is there a breeze? What does the lauri look like? Is it clear, or are there clouds? · It often helps to add a path to your scene. For example, as you enter the meadow, imagine a path leading you through the meadow to the trees on the other side. As you follow the path farther into the [facility] you feel more and more relaxed. · When you are deep into your scene and are feeling relaxed, take a few minutes to breathe slowly and feel the calm. · When you are ready, slowly take yourself out of the scene back to the present. Tell yourself that you will feel relaxed and refreshed and will bring that sense of calm with you. · Count to 3, and open your eyes. Where can you learn more? Go to http://www.gray.com/  Enter T922 in the search box to learn more about \"Week 40 of Your Pregnancy: Care Instructions. \"  Current as of: June 16, 2021               Content Version: 13.0  © 1044-6577 [employer]. Care instructions adapted under license by [employer] (which disclaims liability or warranty for this information). If you have questions about a medical condition or this instruction, always ask your healthcare professional. [place] [address] any warranty or liability for your use of this information.

## 2021-11-30 NOTE — ED PROVIDER NOTES
[facility]NI History and Physical    Patient is a 32 y.o.  at 40w4d with lei 21 who presents to the ANNI with c/o contractions at 0012. She reports contractions started around 0530 this AM, then stopped during the day and restarted around 1500 and are now q8-12 minutes. Scant blood tinged mucous with mucous plug. She reports good FM. Denies LOF, n/v/d, fever, cough, sore throat, SOB/difficulty breathing, loss of taste/smell, exposure to anyone with COVID, h/a, changes in vision, RUQ/epigastric pain. She reports prenatal care with Dr. 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 No pertinent surgical history.       Family History:   Problem Relation Age of Onset    Cancer Maternal Grandmother     Cancer Paternal Grandfather     Cancer Cousin        Social History     Socioeconomic History    Marital status: SINGLE     Spouse name: Not on file    Number of children: Not on file    Years of education: Not on file    Highest education level: Not on file   Occupational History    Not on file   Tobacco Use    Smoking status: Never Smoker    Smokeless tobacco: Never Used   Vaping Use    Vaping Use: Never used   Substance and Sexual Activity    Alcohol use: Not Currently    Drug use: Never    Sexual activity: Not on file   Other Topics Concern     Service Not Asked    Blood Transfusions Not Asked    Caffeine Concern Not Asked    Occupational Exposure Not Asked   [place] Hazards Not Asked    Sleep Concern Not Asked    Stress Concern Not Asked    Weight Concern Not Asked    Special Diet Not Asked    Back Care Not Asked    Exercise Not Asked    Bike Helmet Not Asked   [address] Not Asked    Self-Exams Not Asked   Social History Narrative    Not on file     Social Determinants of Health     Financial Resource Strain:     Difficulty of Paying Living Expenses: Not on file   Food Insecurity:     Worried About Running Out of Food in the Last Year: Not on file    Morgan of Food in the Last Year: Not on file   Transportation Needs:     Lack of Transportation (Medical): Not on file    Lack of Transportation (Non-Medical): Not on file   Physical Activity:     Days of Exercise per Week: Not on file    Minutes of Exercise per Session: Not on file   Stress:     Feeling of Stress : Not on file   Social Connections:     Frequency of Communication with Friends and Family: Not on file    Frequency of Social Gatherings with Friends and Family: Not on file    Attends Mandaeism Services: Not on file    Active Member of [address] [place] [address] or Organizations: Not on file    Attends Club or Organization Meetings: Not on file    Marital Status: Not on file   Intimate Partner Violence:     Fear of Current or Ex-Partner: Not on file    Emotionally Abused: Not on file    Physically Abused: Not on file    Sexually Abused: Not on file   Housing Stability:     Unable to Pay for Housing in the Last Year: Not on file    Number of [place] in the Last Year: Not on file    Unstable Housing in the Last Year: Not on file   Denies tobacco, alcohol, illicits  Denies hx STIs other than HPV on pap      ALLERGIES: Pcn [penicillins]    Review of Systems   Constitutional: Negative for chills and fever. Eyes: Negative for visual disturbance. Respiratory: Negative for cough and shortness of breath.     Cardiovascular: Negative for chest pain and leg swelling. Gastrointestinal: Positive for abdominal pain. Negative for diarrhea, nausea and vomiting. Contractions   Genitourinary: Positive for vaginal bleeding. Negative for vaginal discharge. Blood tinged mucous   Musculoskeletal: Negative for gait problem. Neurological: Negative for speech difficulty and headaches. All other systems reviewed and are negative. Vitals:    21 0020 21 0034   BP:  121/79   Pulse:  85   Resp:  18   Temp:  98.1 °F (36.7 °C)   Weight: 120.7 kg (266 lb)    Height: 5' 5\" (1.651 m)             Physical Exam  Vitals and nursing note reviewed. Exam conducted with a chaperone present. Constitutional:       General: She is awake. She is not in acute distress. Appearance: Normal appearance. She is well-developed, well-groomed and normal weight. Comments: Appears comfortable   HENT:      Head: Normocephalic. Nose: Nose normal.   Cardiovascular:      Rate and Rhythm: Normal rate and regular rhythm. Pulses: Normal pulses. Pulmonary:      Effort: Pulmonary effort is normal. No respiratory distress. Comments: Breathing easy and unlabored  Abdominal:      Tenderness: There is no abdominal tenderness. Comments: Gravid c/w 40w  FHTs: 135s, +accels, neg decels, moderate variability  [place]: 2 on monitor while in ANNI 11 minutes apart, mild, talking through them   Genitourinary:     General: Normal vulva. Exam position: Supine. Labia:         Right: No lesion. Left: No lesion. Vagina: Normal.      Cervix: Dilated. Uterus: Normal. Enlarged. Rectum: Normal.      Comments: SVE 1/60/-2, posterior, vtx  Membranes intact  No VB noted or on glove after exam  Pelvis feels adequate  No lesions noted  Musculoskeletal:         General: Normal range of motion. Cervical back: Normal range of motion. Right lower le+ Edema present. Left lower le+ Edema present.    Skin:     General: Skin is warm and dry. Neurological:      Mental Status: She is alert and oriented to person, place, and time. Gait: Gait is intact. Psychiatric:         Mood and Affect: Mood normal.         Speech: Speech normal.         Behavior: Behavior normal. Behavior is cooperative. Thought Content: Thought content normal.         Cognition and Memory: Cognition and memory normal.         Judgment: Judgment normal.          MDM  Number of Diagnoses or Management Options  40 weeks gestation of pregnancy: established and improving  Abdominal pain affecting pregnancy: new and requires workup  False labor at or after 37 completed weeks of gestation, antepartum: new and requires workup     Amount and/or Complexity of Data Reviewed  Clinical lab tests: ordered and reviewed  Tests in the medicine section of CPT®: ordered and reviewed  Review and summarize past medical records: yes  Independent visualization of images, tracings, or specimens: yes (NST)    Risk of Complications, Morbidity, and/or Mortality  Presenting problems: moderate  Diagnostic procedures: moderate  Management options: moderate    Critical Care  Total time providing critical care: < 30 minutes    Patient Progress  Patient progress: stable    ED Course as of 21 0045   Tue 2021   0031 C/o contractions q8-12. Good FM. Small amount of blood tinged mucous plug. Denies LOF. SVE /-2, vtx, membranes intact. No change from office.  D/c home after reactive NST [SB]      ED Course User Index  [SB] Delpha Round A, CNM       Procedures  NST, SVE    Impression:  at 40w4d IUP  Abdominal pain in pregnancy  False labor or prodromal labor  Cat 1 tracing  GBS negative  Obesity  Rubella NI  Anxiety/depression      Plan:  Admit to [facility] for NST/SVE  Reviewed no change in SVE from office last week  D/c home with instructions  Reviewed labor parameters/precautions, FKCs, when to call/return  Reviewed prenatal records   Follow up at regular prenatal visit on Wednesday or prn concerns   Reviewed plan with patient/partner, all questions asked/answered and they agree with plan

## 2021-12-01 PROCEDURE — 0KQM0ZZ REPAIR PERINEUM MUSCLE, OPEN APPROACH: ICD-10-PCS | Performed by: OBSTETRICS & GYNECOLOGY

## 2021-12-01 PROCEDURE — 65410000002 HC RM PRIVATE OB

## 2021-12-01 PROCEDURE — 75410000002 HC LABOR FEE PER 1 HR

## 2021-12-01 PROCEDURE — 3E033VJ INTRODUCTION OF OTHER HORMONE INTO PERIPHERAL VEIN, PERCUTANEOUS APPROACH: ICD-10-PCS | Performed by: OBSTETRICS & GYNECOLOGY

## 2021-12-01 PROCEDURE — 74011000250 HC RX REV CODE- 250: Performed by: ANESTHESIOLOGY

## 2021-12-01 PROCEDURE — 75410000003 HC RECOV DEL/VAG/CSECN EA 0.5 HR

## 2021-12-01 PROCEDURE — 74011250637 HC RX REV CODE- 250/637: Performed by: OBSTETRICS & GYNECOLOGY

## 2021-12-01 PROCEDURE — 74011250636 HC RX REV CODE- 250/636: Performed by: OBSTETRICS & GYNECOLOGY

## 2021-12-01 PROCEDURE — 10907ZC DRAINAGE OF AMNIOTIC FLUID, THERAPEUTIC FROM PRODUCTS OF CONCEPTION, VIA NATURAL OR ARTIFICIAL OPENING: ICD-10-PCS | Performed by: OBSTETRICS & GYNECOLOGY

## 2021-12-01 PROCEDURE — 76060000078 HC EPIDURAL ANESTHESIA

## 2021-12-01 PROCEDURE — 75410000000 HC DELIVERY VAGINAL/SINGLE

## 2021-12-01 PROCEDURE — 00HU33Z INSERTION OF INFUSION DEVICE INTO SPINAL CANAL, PERCUTANEOUS APPROACH: ICD-10-PCS | Performed by: OBSTETRICS & GYNECOLOGY

## 2021-12-01 PROCEDURE — 74011250636 HC RX REV CODE- 250/636: Performed by: ANESTHESIOLOGY

## 2021-12-01 RX ORDER — OXYCODONE AND ACETAMINOPHEN 5; 325 MG/1; MG/1
2 TABLET ORAL
Status: DISCONTINUED | OUTPATIENT
Start: 2021-12-01 | End: 2021-12-03 | Stop reason: HOSPADM

## 2021-12-01 RX ORDER — LANOLIN ALCOHOL/MO/W.PET/CERES
3 CREAM (GRAM) TOPICAL
Status: DISCONTINUED | OUTPATIENT
Start: 2021-12-01 | End: 2021-12-03 | Stop reason: HOSPADM

## 2021-12-01 RX ORDER — ONDANSETRON 4 MG/1
4 TABLET, ORALLY DISINTEGRATING ORAL
Status: DISCONTINUED | OUTPATIENT
Start: 2021-12-01 | End: 2021-12-03 | Stop reason: HOSPADM

## 2021-12-01 RX ORDER — IBUPROFEN 600 MG/1
600 TABLET ORAL
Status: DISCONTINUED | OUTPATIENT
Start: 2021-12-01 | End: 2021-12-03 | Stop reason: HOSPADM

## 2021-12-01 RX ORDER — BUPIVACAINE HYDROCHLORIDE 2.5 MG/ML
INJECTION, SOLUTION EPIDURAL; INFILTRATION; INTRACAUDAL
Status: COMPLETED
Start: 2021-12-01 | End: 2021-12-01

## 2021-12-01 RX ORDER — SODIUM CHLORIDE 0.9 % (FLUSH) 0.9 %
5-40 SYRINGE (ML) INJECTION AS NEEDED
Status: DISCONTINUED | OUTPATIENT
Start: 2021-12-01 | End: 2021-12-03 | Stop reason: HOSPADM

## 2021-12-01 RX ORDER — OXYTOCIN/RINGER'S LACTATE 30/500 ML
10 PLASTIC BAG, INJECTION (ML) INTRAVENOUS AS NEEDED
Status: DISCONTINUED | OUTPATIENT
Start: 2021-12-01 | End: 2021-12-03 | Stop reason: HOSPADM

## 2021-12-01 RX ORDER — FENTANYL CITRATE 50 UG/ML
INJECTION, SOLUTION INTRAMUSCULAR; INTRAVENOUS AS NEEDED
Status: DISCONTINUED | OUTPATIENT
Start: 2021-12-01 | End: 2021-12-01 | Stop reason: HOSPADM

## 2021-12-01 RX ORDER — OXYCODONE AND ACETAMINOPHEN 5; 325 MG/1; MG/1
1 TABLET ORAL
Status: DISCONTINUED | OUTPATIENT
Start: 2021-12-01 | End: 2021-12-03 | Stop reason: HOSPADM

## 2021-12-01 RX ORDER — SODIUM CHLORIDE 0.9 % (FLUSH) 0.9 %
5-40 SYRINGE (ML) INJECTION EVERY 8 HOURS
Status: DISCONTINUED | OUTPATIENT
Start: 2021-12-01 | End: 2021-12-03 | Stop reason: HOSPADM

## 2021-12-01 RX ORDER — SIMETHICONE 80 MG
80 TABLET,CHEWABLE ORAL
Status: DISCONTINUED | OUTPATIENT
Start: 2021-12-01 | End: 2021-12-03 | Stop reason: HOSPADM

## 2021-12-01 RX ORDER — OXYTOCIN/RINGER'S LACTATE 30/500 ML
87.3 PLASTIC BAG, INJECTION (ML) INTRAVENOUS AS NEEDED
Status: DISCONTINUED | OUTPATIENT
Start: 2021-12-01 | End: 2021-12-03 | Stop reason: HOSPADM

## 2021-12-01 RX ORDER — DOCUSATE SODIUM 100 MG/1
100 CAPSULE, LIQUID FILLED ORAL
Status: DISCONTINUED | OUTPATIENT
Start: 2021-12-01 | End: 2021-12-03 | Stop reason: HOSPADM

## 2021-12-01 RX ORDER — DIPHENHYDRAMINE HCL 25 MG
25 CAPSULE ORAL
Status: DISCONTINUED | OUTPATIENT
Start: 2021-12-01 | End: 2021-12-03 | Stop reason: HOSPADM

## 2021-12-01 RX ORDER — HYDROCORTISONE 1 %
CREAM (GRAM) TOPICAL AS NEEDED
Status: DISCONTINUED | OUTPATIENT
Start: 2021-12-01 | End: 2021-12-03 | Stop reason: HOSPADM

## 2021-12-01 RX ORDER — AMMONIA 15 % (W/V)
1 AMPUL (EA) INHALATION AS NEEDED
Status: DISCONTINUED | OUTPATIENT
Start: 2021-12-01 | End: 2021-12-03 | Stop reason: HOSPADM

## 2021-12-01 RX ORDER — BUPIVACAINE HYDROCHLORIDE 2.5 MG/ML
INJECTION, SOLUTION EPIDURAL; INFILTRATION; INTRACAUDAL
Status: COMPLETED | OUTPATIENT
Start: 2021-12-01 | End: 2021-12-01

## 2021-12-01 RX ORDER — LIDOCAINE HYDROCHLORIDE AND EPINEPHRINE 15; 5 MG/ML; UG/ML
INJECTION, SOLUTION EPIDURAL
Status: COMPLETED | OUTPATIENT
Start: 2021-12-01 | End: 2021-12-01

## 2021-12-01 RX ADMIN — ACETAMINOPHEN ORAL SOLUTION 650 MG: 650 SOLUTION ORAL at 22:37

## 2021-12-01 RX ADMIN — SODIUM CHLORIDE, POTASSIUM CHLORIDE, SODIUM LACTATE AND CALCIUM CHLORIDE 999 ML/HR: 600; 310; 30; 20 INJECTION, SOLUTION INTRAVENOUS at 01:14

## 2021-12-01 RX ADMIN — SODIUM CHLORIDE, POTASSIUM CHLORIDE, SODIUM LACTATE AND CALCIUM CHLORIDE 125 ML/HR: 600; 310; 30; 20 INJECTION, SOLUTION INTRAVENOUS at 09:06

## 2021-12-01 RX ADMIN — SODIUM CHLORIDE, POTASSIUM CHLORIDE, SODIUM LACTATE AND CALCIUM CHLORIDE 125 ML/HR: 600; 310; 30; 20 INJECTION, SOLUTION INTRAVENOUS at 01:24

## 2021-12-01 RX ADMIN — IBUPROFEN 600 MG: 600 TABLET ORAL at 12:08

## 2021-12-01 RX ADMIN — LIDOCAINE HYDROCHLORIDE,EPINEPHRINE BITARTRATE 5 ML: 15; .005 INJECTION, SOLUTION EPIDURAL; INFILTRATION; INTRACAUDAL; PERINEURAL at 07:22

## 2021-12-01 RX ADMIN — BUPIVACAINE HYDROCHLORIDE 5 ML: 2.5 INJECTION, SOLUTION EPIDURAL; INFILTRATION; INTRACAUDAL at 07:22

## 2021-12-01 RX ADMIN — IBUPROFEN 600 MG: 600 TABLET ORAL at 18:31

## 2021-12-01 RX ADMIN — Medication 10 ML/HR: at 05:06

## 2021-12-01 RX ADMIN — FENTANYL CITRATE 100 MCG: 50 INJECTION, SOLUTION INTRAMUSCULAR; INTRAVENOUS at 06:32

## 2021-12-01 RX ADMIN — DOCUSATE SODIUM 100 MG: 100 CAPSULE ORAL at 18:31

## 2021-12-01 NOTE — PROGRESS NOTES
1930 - Bedside and Verbal shift change report given to Marcy Conway (oncoming nurse) by Elly Loza (offgoing nurse). Report included the following information SBAR, MAR and Recent Results. 2055 - patient request epidural.   2115 - Anesthesia called for epidural placement. 2122 - Dr. Ad Pederson at bedside for epidural placement. 0000 - patient having pain with contractions, called Dr. Ad Pederson. Ty Pederson at bedside to assess, epidural redosed. Orders received to call if not better in 10-15 minutes. 0025 - patient is still having pain with contractions, called Dr. Ad Pederson. 18 - Dr. Ad Pederson at bedside to assess, epidural catheter repositioned and redosed. Orders received to call in 10-15 minutes if no change. 0055 - patient still having pain with contractions, Dr. Ad Pederson notified. Orders received to setup for epidural replacement. 4238 - Dr. Ad Pederson at beside to replace epidural.   0530 - patient complaining of contraction pain coming back, William BENNETT notified and is coming to assess. Chanel 26, DONALD at bedside to assess, SVE 6/90/+2. Discussed trying some position changes to see if that helps with the contraction pain, if not will call anesthesia. Patient agrees with plan. 0400 - Right sidelying release   0600 - Left sidelying release  0610 - Right side, peanutball, trendelenberg  0620 - patient complaining of contraction pain getting more intense. 5841 - Dr Ad Pederson notified, states he will come give another bolus. 0630 - Bed out of trendelenberg  7321 - Dr. Ad Pederson at bedside to redose epidural.   0019 - patient is still having pain with contractions, getting more intense and is not coping well. SVE 10/100/+1.   0053 - tried a practice push, patient unable to push effectively due to the pain with contractions. 1333 - patient in knee chest, still no relief, unable to push.    9951 - Patient is not coping well with contraction pain, requesting epidural be replaced. Dr. Kei Ornelas notified. 26 - Dr. Kei Ornelas and Dr. Leslie Denny at bedside to assess, decision made to replace epidural.   8842 - 3rd epidural placed  0730 - patient laying on left side, says she has gotten some relief already. 0730 - Bedside and Verbal shift change report given to Hakeem Mercedes (oncoming nurse) by Reji Ellington (offgoing nurse). Report included the following information SBAR, MAR and Recent Results.

## 2021-12-01 NOTE — PROGRESS NOTES
0740 Bedside and Verbal shift change report given to KIZZY Billings RN (oncoming nurse) by Kiarra Neri RN (offgoing nurse). Report included the following information SBAR, Intake/Output, MAR and Recent Results. 7946 Dr Kurtis Currie at bedside and reviewed fhr strip    8100 RN at bedside, continuously monitoring FHR tracing    1010 RN at bedside, continuously monitoring FHR tracing      1240 TRANSFER - OUT REPORT:    Verbal report given to MICHEAL Saldivar RN(name) on Juancho Garcia  being transferred to [facility]U(unit) for routine progression of care       Report consisted of patients Situation, Background, Assessment and   Recommendations(SBAR). Information from the following report(s) SBAR, Intake/Output, MAR and Recent Results was reviewed with the receiving nurse. Lines:   Peripheral IV 11/30/21 Anterior; Left Hand (Active)   Site Assessment Clean, dry, & intact 12/01/21 0811   Phlebitis Assessment 0 12/01/21 0811   Infiltration Assessment 0 12/01/21 0811   Dressing Status Clean, dry, & intact 12/01/21 0811   Dressing Type Transparent; Tape 12/01/21 0811   Hub Color/Line Status Pink 12/01/21 4864        Opportunity for questions and clarification was provided.       Patient transported with:   Registered Nurse

## 2021-12-01 NOTE — PROGRESS NOTES
Verbal shift change report given to [facility] RN (oncoming nurse) by Corinna Jc RN (offgoing nurse). Report included the following information SBAR, Kardex, Intake/Output, MAR and Recent Results.

## 2021-12-01 NOTE — ROUTINE PROCESS
TRANSFER - IN REPORT:    Verbal report received from KIZZY Braxton RN (name) on Caryle Calix  being received from L & D (unit) for routine progression of care      Report consisted of patients Situation, Background, Assessment and   Recommendations(SBAR). Information from the following report(s) SBAR, Intake/Output and MAR was reviewed with the receiving nurse. Opportunity for questions and clarification was provided. Assessment completed upon patients arrival to unit and care assumed.

## 2021-12-01 NOTE — LACTATION NOTE
This note was copied from a baby's chart. Initial Lactation Consultation - Baby born vaginally this morning to  mom at 36 5/7 weeks gestation. Mom noticed breast changes during her pregnancy and has been able to express drops of colostrum. Mom states baby latched and sucked for a few minutes right after delivery. Baby has been gagging and spitting up thick, clear fluid. Pediatrician noticed the gagging and said if baby continues we can deep suction. We got baby to burp a few times and then she had a large meconium stool. We attempted to get baby to latch. She was rooting and would latch to the nipple but would then push her tongue forward and come off the breast. We tried for 10-15 minutes and then I helped mom with hand expression. We were able to express 15 drops of colostrum that we put into baby's mouth. Feeding Plan: Mother will keep baby skin to skin as often as possible, feed on demand, respond to feeding cues, obtain latch, listen for audible swallowing, be aware of signs of oxytocin release/ cramping, thirst and sleepiness while breastfeeding. Mom will not limit the time the baby is at the breast. She will allow the baby to completely finish one breast and then offer the second breast at each feeding. 1930 - baby struggling to latch to the breast. I gave mom a shield and showed parents how to apply it. Baby was able to latch to the shield with some sweetease. She sucked for a few minutes and then we were able to get her latched directly to the breast. She nursed another 6 minutes with some swallowing noted. Mom will use the shield as needed to get baby to nurse.

## 2021-12-01 NOTE — ANESTHESIA PROCEDURE NOTES
Epidural Block    Patient location during procedure: OB  Reason for block: labor epidural  Staffing  Performed: attending   Anesthesiologist: Nazario Leggett DO  Preanesthetic Checklist  Completed: patient identified, IV checked, site marked, risks and benefits discussed, surgical consent, monitors and equipment checked, pre-op evaluation and timeout performed  Block Placement  Patient position: sitting  Prep: ChloraPrep  Sterility prep: cap, drape, gloves and mask  Sedation level: no sedation  Patient monitoring: continuous pulse oximetry and heart rate  Approach: midline  Location: lumbar  Lumbar location: L3-L4  Epidural  Loss of resistance technique: air  Guidance: landmark technique  Needle  Needle type: Tuohy   Needle gauge: 17 G  Needle length: 9 cm  Needle insertion depth: 7.5 cm  Catheter type: end hole  Catheter size: 19 G  Catheter at skin depth: 12 cm  Catheter securement method: clear occlusive dressing and surgical tape  Test dose: negative  Medications Administered  Lidocaine-EPINEPHrine (XYLOCAINE) 1.5 %-1:200,000 Epidural, 5 mL  bupivacaine (PF) (MARCAINE) 0.25% Epidural, 5 mL  Assessment  Sensory level: T10  Block outcome: pain improved  Number of attempts: 1  Procedure assessment: patient tolerated procedure well with no immediate complications  Additional Notes  Previous 2 epidurals not working, replaced

## 2021-12-01 NOTE — PROGRESS NOTES
Labor Progress Note    S: Patient seen, fetal heart rate and contraction pattern evaluated. Having breakthrough pain. Anesthesia in room to re-dose. Partner and mother at bedside. Physical Exam:  Patient Vitals for the past 4 hrs:   Temp Pulse Resp BP SpO2   21 0516 98.6 °F (37 °C) 82 16 117/68 98 %   21 0458  80  (!) 109/57 98 %   21 0443  79  (!) 100/55 95 %   21 0429  74 16 (!) 91/54 96 %   21 0413  77 16 (!) 107/53 95 %   21 0357  76  (!) 87/51 95 %   21 0355  79  (!) 83/47 95 %   21 0256 98.8 °F (37.1 °C) 88 16 (!) 100/55 98 %   21 0237  89  (!) 89/51    21 0200  90  (!) 104/59          Cervical Exam: 5-6/90/-2, ? Asynclitic   Membranes:  AROM, clear  Uterine Contractions:  Frequency: q2-4 minutes, moderate to strong  Fetal Heart Rate: Reactive, 140s, +accels, neg decels, moderate variability      Assessment/Plan:    32 y.o.  at 40w5d IUP  Active labor with pitocin  Cat 1 tracing  GBS negative    P:  Reviewed AROM- patient consents  Continue to titrate pitocin per protocol  All questions asked/answered and patient/partner agree with plan    Marbin Hernandez CNM

## 2021-12-01 NOTE — PROGRESS NOTES
Labor Progress Note    S: Patient seen, fetal heart rate and contraction pattern evaluated. Asking about epidural. Feeling contractions about q2-4 minutes. Partner and mother at bedside. Physical Exam:  Patient Vitals for the past 4 hrs:   Temp Pulse Resp BP SpO2   21 1946  83  103/74    21 1905 98.7 °F (37.1 °C) 80 16 116/74 97 %         Cervical Exam: deferred.   Membranes:  Intact  Uterine Contractions:  Frequency: Irregular q2-6 minutes  Fetal Heart Rate: Reactive, 125s, +accels, neg decels, moderate variability      Assessment/Plan:    32 y.o.  at 40w4d IUP  Latent labor  Cat 1 tracing  GBS negative  Obesity  Rubella NI  Anxiety/depression- no meds    P:  Assumed care of patient  Introduced self to patient/partner  Continue present management  Epidural per patient request   Pitocin prn if contractions- reviewed with patient and agrees with plan to augment if needed  All questions asked/answered and patient/partner agree with plan    Leonie Tatum CNM

## 2021-12-01 NOTE — ANESTHESIA PREPROCEDURE EVALUATION
Relevant Problems   No relevant active problems       Anesthetic History   No history of anesthetic complications            Review of Systems / Medical History  Patient summary reviewed, nursing notes reviewed and pertinent labs reviewed    Pulmonary  Within defined limits                 Neuro/Psych   Within defined limits           Cardiovascular  Within defined limits                     GI/Hepatic/Renal  Within defined limits              Endo/Other        Morbid obesity     Other Findings            Physical Exam    Airway  Mallampati: II  TM Distance: > 6 cm  Neck ROM: normal range of motion   Mouth opening: Normal     Cardiovascular  Regular rate and rhythm,  S1 and S2 normal,  no murmur, click, rub, or gallop             Dental  No notable dental hx       Pulmonary  Breath sounds clear to auscultation               Abdominal  GI exam deferred       Other Findings            Anesthetic Plan    ASA: 2  Anesthesia type: epidural            Anesthetic plan and risks discussed with: Patient

## 2021-12-02 PROCEDURE — 65410000002 HC RM PRIVATE OB

## 2021-12-02 PROCEDURE — 74011250637 HC RX REV CODE- 250/637: Performed by: OBSTETRICS & GYNECOLOGY

## 2021-12-02 RX ORDER — ACETAMINOPHEN 325 MG/1
650 TABLET ORAL
Status: DISCONTINUED | OUTPATIENT
Start: 2021-12-02 | End: 2021-12-03 | Stop reason: HOSPADM

## 2021-12-02 RX ORDER — IBUPROFEN 600 MG/1
600 TABLET ORAL
Qty: 30 TABLET | Refills: 0 | Status: SHIPPED | OUTPATIENT
Start: 2021-12-02

## 2021-12-02 RX ADMIN — IBUPROFEN 600 MG: 600 TABLET ORAL at 13:07

## 2021-12-02 RX ADMIN — OXYCODONE AND ACETAMINOPHEN 1 TABLET: 5; 325 TABLET ORAL at 03:08

## 2021-12-02 RX ADMIN — OXYCODONE AND ACETAMINOPHEN 1 TABLET: 5; 325 TABLET ORAL at 23:34

## 2021-12-02 RX ADMIN — IBUPROFEN 600 MG: 600 TABLET ORAL at 19:20

## 2021-12-02 RX ADMIN — DOCUSATE SODIUM 100 MG: 100 CAPSULE ORAL at 17:55

## 2021-12-02 RX ADMIN — OXYCODONE AND ACETAMINOPHEN 1 TABLET: 5; 325 TABLET ORAL at 19:20

## 2021-12-02 RX ADMIN — OXYCODONE AND ACETAMINOPHEN 1 TABLET: 5; 325 TABLET ORAL at 11:15

## 2021-12-02 RX ADMIN — IBUPROFEN 600 MG: 600 TABLET ORAL at 06:45

## 2021-12-02 RX ADMIN — OXYCODONE AND ACETAMINOPHEN 1 TABLET: 5; 325 TABLET ORAL at 15:34

## 2021-12-02 RX ADMIN — IBUPROFEN 600 MG: 600 TABLET ORAL at 00:46

## 2021-12-02 NOTE — PROGRESS NOTES
Post-Partum Day Number 1 Progress Note    Karma Mejia     Assessment: Doing well, post partum day 1    Plan:  - Continue routine postpartum and perineal care as well as maternal education.  - N/A   - Plan discharge home [address] Discharge Date: Tomorrow. Information for the patient's :  Citlaly Mooney [[id number]]   Vaginal, Spontaneous    Patient doing well without significant complaint. Voiding without difficulty, normal lochia. Vitals:  Visit Vitals  BP 99/65 (BP 1 Location: Left upper arm, BP Patient Position: At rest;Semi fowlers)   Pulse 82   Temp 97.9 °F (36.6 °C)   Resp 16   Ht 5' 5\" (1.651 m)   Wt 120.7 kg (266 lb)   SpO2 96%   Breastfeeding Unknown   BMI 44.26 kg/m²     Temp (24hrs), Av.5 °F (36.9 °C), Min:97.9 °F (36.6 °C), Max:99 °F (37.2 °C)        Exam:   Patient without distress. Fundus firm, nontender per nursing fundal checks. Perineum with normal lochia noted per nursing assessment. Lower extremities are negative for pathological edema. Labs:     Lab Results   Component Value Date/Time    WBC 13.6 (H) 2021 06:14 PM    HGB 13.3 2021 06:14 PM    HCT 40.3 2021 06:14 PM    PLATELET 830 (L)  06:14 PM       No results found for this or any previous visit (from the past 24 hour(s)).

## 2021-12-02 NOTE — PROGRESS NOTES
Bedside shift change report given to Bandar Medrano (oncoming nurse) by KIZZY Gonzalez RN (offgoing nurse). Report included the following information SBAR.

## 2021-12-02 NOTE — ROUTINE PROCESS
Bedside shift change report given to KIZZY To (oncoming nurse) by Manjeet Her (offgoing nurse). Report included the following information SBAR.

## 2021-12-02 NOTE — LACTATION NOTE
This note was copied from a baby's chart. Baby nursing well today,  deep latch obtained, mother is comfortable, baby feeding vigorously with rhythmic suck, swallow, breathe pattern, audible swallowing, and evident milk transfer, both breasts offered, baby is asleep following feeding. Baby learned to latch directly to breast.  Baby having audible gulping of mother's abundant colostrum.

## 2021-12-03 VITALS
RESPIRATION RATE: 16 BRPM | HEIGHT: 65 IN | SYSTOLIC BLOOD PRESSURE: 112 MMHG | DIASTOLIC BLOOD PRESSURE: 71 MMHG | HEART RATE: 89 BPM | TEMPERATURE: 98.1 F | OXYGEN SATURATION: 100 % | WEIGHT: 266 LBS | BODY MASS INDEX: 44.32 KG/M2

## 2021-12-03 PROCEDURE — 74011250637 HC RX REV CODE- 250/637: Performed by: OBSTETRICS & GYNECOLOGY

## 2021-12-03 PROCEDURE — 90707 MMR VACCINE SC: CPT | Performed by: OBSTETRICS & GYNECOLOGY

## 2021-12-03 PROCEDURE — 74011250636 HC RX REV CODE- 250/636: Performed by: OBSTETRICS & GYNECOLOGY

## 2021-12-03 RX ADMIN — MEASLES, MUMPS, AND RUBELLA VIRUS VACCINE LIVE 0.5 ML: 1000; 12500; 1000 INJECTION, POWDER, LYOPHILIZED, FOR SUSPENSION SUBCUTANEOUS at 11:53

## 2021-12-03 RX ADMIN — OXYCODONE AND ACETAMINOPHEN 1 TABLET: 5; 325 TABLET ORAL at 03:41

## 2021-12-03 RX ADMIN — IBUPROFEN 600 MG: 600 TABLET ORAL at 12:52

## 2021-12-03 RX ADMIN — OXYCODONE AND ACETAMINOPHEN 1 TABLET: 5; 325 TABLET ORAL at 07:20

## 2021-12-03 RX ADMIN — IBUPROFEN 600 MG: 600 TABLET ORAL at 01:33

## 2021-12-03 RX ADMIN — IBUPROFEN 600 MG: 600 TABLET ORAL at 07:20

## 2021-12-03 RX ADMIN — OXYCODONE AND ACETAMINOPHEN 1 TABLET: 5; 325 TABLET ORAL at 11:53

## 2021-12-03 NOTE — DISCHARGE INSTRUCTIONS
Patient Education   Patient Education   Patient Education        Learning About Birth Control After Childbirth  What is birth control? Birth control (contraception) is any method used to prevent pregnancy. Wait until you're healed before you have sexual intercourse. This takes about 4 to 6 weeks. If you have sex without birth control, there is a chance that you could get pregnant. This is true even if you haven't started having periods again. Even if you breastfeed, you can still get pregnant. Most experts suggest waiting at least 18 months to get pregnant again. This can reduce risks for you and the baby. There may be reasons for you to get pregnant sooner, though. So talk with your doctor about the risks and benefits. The only sure way to not get pregnant is to not have sex. But finding a good method of birth control that you're comfortable with can help you avoid an unplanned pregnancy. Your doctor can help you choose the birth control method that's right for you. What are the types of birth control? · Long-acting reversible contraception (LARC). This is the most effective reversible method you can use to prevent pregnancy. LARCs are implants and intrauterine devices (IUDs). While they are being used, they usually prevent pregnancy for years. If you decide you want to get pregnant, you can have them removed. ? Implants are placed under the skin of the arm. This can be done right after you give birth. They release the hormone progestin. They prevent pregnancy for about 3 years. ? IUDs are placed in the uterus by a doctor. This can be done right after you give birth, if you and your doctor discuss it beforehand. Or it can be done at a doctor visit later. There are two main types of IUDs--the copper IUD and the hormonal IUD. The hormonal IUD releases progestin. IUDs prevent pregnancy for 3 to 10 years, depending on the type. · Hormonal methods. They are very good at preventing pregnancy.  Combination birth control pills (\"the pill\"), skin patches, and vaginal rings release the hormones estrogen and progestin. Depo-Provera is a shot you get every 3 months. Shots, mini-pills, IUDs, and implants release progestin only. It's best to use progestin-only options in the first few weeks after you give birth. · Barrier methods. These don't prevent pregnancy as well as implants, IUDs, or hormonal methods do. Barrier methods include condoms, diaphragms, and cervical caps. You must use them every time you have sex. If you had a diaphragm or cervical cap before you got pregnant, talk to your doctor to see if you need a different size. Condoms can be used anytime after you give birth. · Natural family planning. This is also known as fertility awareness or the rhythm method. It can work if you and your partner are very careful and you have a regular ovulation cycle. But it doesn't work better than other birth control methods. You will need to keep good records so you know when you are most likely to become pregnant. And during those times, you will need to use a barrier method or not have sex. · Permanent birth control (sterilization). It gives you lasting protection against pregnancy. A man can have a vasectomy. A woman can have her tubes tied (tubal ligation). But this is only a good choice if you are sure that you don't want any more children. · Emergency contraception. This is a backup method to prevent pregnancy if you didn't use birth control or if a condom breaks. The most effective emergency contraception is prescribed by a doctor. This includes the copper IUD (inserted by a doctor) or a prescription pill. You can also get emergency contraceptive pills without a prescription at most drugstores. How can you get birth control? · You can buy:  ? Condoms and spermicides without a prescription. You can get them in drugstores, online, and in many grocery stores.   ? Some forms of emergency contraception without a prescription. You can get these at most drugstores. · You need to see a doctor or visit family planning clinic to:  ? Get a prescription for birth control pills and other methods that use hormones. ? Have an implant or IUD inserted. This includes the type of IUD used for emergency contraception. ? Get a hormone shot. ? Get a prescription for a diaphragm or cervical cap. ? Get a prescription for certain kinds of emergency contraception. Follow-up care is a key part of your treatment and safety. Be sure to make and go to all appointments, and call your doctor if you are having problems. It's also a good idea to know your test results and keep a list of the medicines you take. Where can you learn more? Go to http://www.gray.com/  Enter [id number] in the search box to learn more about \"Learning About Birth Control After Childbirth. \"  Current as of: June 16, 2021               Content Version: 13.0  © 0733-0045 [employer]. Care instructions adapted under license by [employer] (which disclaims liability or warranty for this information). If you have questions about a medical condition or this instruction, always ask your healthcare professional. [place] [address] any warranty or liability for your use of this information. Depression After Childbirth: Care Instructions  Overview     Many women get the \"baby blues\" during the first few days after childbirth. You may lose sleep, feel irritable, and cry easily. You may feel happy one minute and sad the next. Hormone changes are one cause of these emotional changes. Also, the demands of a new baby, along with visits from relatives or other family needs, can add to the stress. The \"baby blues\" often peak around the fourth day. Then they ease up in less than 2 weeks.   If your moodiness or anxiety lasts for more than 2 weeks, or if you feel like life is not worth living, you may have postpartum depression. This is different for each person. Some mothers with serious depression may worry intensely about their infant's well-being. Others may feel distant from their child. Some mothers may even feel that they might harm their baby. Some may have signs of paranoia, wondering if someone is watching them. Depression is not a sign of weakness. It's a medical condition that requires treatment. Medicine and counseling often work well to reduce depression. Talk to your doctor about taking antidepressant medicine while breastfeeding. Follow-up care is a key part of your treatment and safety. Be sure to make and go to all appointments, and call your doctor if you are having problems. It's also a good idea to know your test results and keep a list of the medicines you take. How do you know if you are depressed? With all the changes in your life, you may not know if you are depressed. Pregnancy sometimes causes changes in how you feel that are similar to the symptoms of depression. Symptoms of depression include:  · Feeling sad or hopeless and losing interest in daily activities. These are the most common symptoms of depression. · Sleeping too much or not enough. · Feeling tired. You may feel as if you have no energy. · Eating too much or too little. · Writing or talking about death, such as writing suicide notes or talking about guns, knives, or pills. Keep the numbers for these national suicide hotlines: 0-575-437-TALK (2-507.602.6394) and 1-171-GGOIHLI (0-585.626.3996). If you or someone you know talks about suicide or feeling hopeless, get help right away. How can you care for yourself at home? · Be safe with medicines. Take your medicines exactly as prescribed. Call your doctor if you think you are having a problem with your medicine. · Eat a healthy diet so that you can keep up your energy. · Get regular daily exercise, such as walks, to help improve your mood. · Get as much sunlight as possible. Keep your shades and curtains open. Get outside as much as you can. · Avoid using alcohol or other substances to feel better. · Get as much rest and sleep as possible. Avoid doing too much. Being too tired can increase depression. · Play stimulating music throughout your day and soothing music at night. · Schedule outings and visits with friends and family. Ask them to call you regularly, so that you don't feel alone. · Ask for help with preparing food and other daily tasks. Family and friends are often happy to help with a . · Be honest with yourself and those who care about you. Tell them about your struggle. · Join a support group of new mothers. No one can better understand the challenges of caring for a  than other new mothers. · If you feel like life is not worth living or you're feeling hopeless, get help right away. Keep the numbers for these national suicide hotlines: 8-257-599-TALK (1-929.159.2728) and 7-701-XVJTKMG (2-186.659.5583). When should you call for help? Call 911 anytime you think you may need emergency care. For example, call if:    · You feel you cannot stop from hurting yourself, your baby, or someone else. Call your doctor now or seek immediate medical care if:    · You are having trouble caring for yourself or your baby.     · You hear voices. Watch closely for changes in your health, and be sure to contact your doctor if:    · You have problems with your depression medicine.     · You do not get better as expected. Where can you learn more? Go to http://www.gray.com/  Enter [id number] in the search box to learn more about \"Depression After Childbirth: Care Instructions. \"  Current as of: 2021               Content Version: 13.0  © 9700-1002 [employer]. Care instructions adapted under license by [employer] (which disclaims liability or warranty for this information).  If you have questions about a medical condition or this instruction, always ask your healthcare professional. [address] any warranty or liability for your use of this information. Postpartum: Care Instructions  Overview  After childbirth (postpartum period), your body goes through many changes. Some of these changes happen over several weeks. In the hours after delivery, your body will begin to recover from childbirth while it prepares to breastfeed your . You may feel emotional during this time. Your hormones can shift your mood without warning for no clear reason. In the first couple of weeks after childbirth, it's common to have emotions that change from happy to sad. You may find it hard to sleep. You may cry a lot. This is called the \"baby blues. \" These overwhelming emotions often go away within a couple of days or weeks. But it's important to discuss your feelings with your doctor. It's easy to get too tired and overwhelmed during the first weeks after childbirth. Don't try to do too much. Get rest whenever you can, accept help from others, and eat well and drink plenty of fluids. In the first couple of weeks after you give birth, your doctor or midwife may want to check in with you and make a plan for any follow-up care you may need. You will likely have a complete postpartum visit in the first 3 months after delivery. At that time, your doctor or midwife will check on your recovery from childbirth and see how you're doing with your emotions. You may also discuss your concerns or questions. Follow-up care is a key part of your treatment and safety. Be sure to make and go to all appointments, and call your doctor if you are having problems. It's also a good idea to know your test results and keep a list of the medicines you take. How can you care for yourself at home? · Sleep or rest when your baby sleeps. · Get help with household chores from family or friends, if you can.  Don't try to do it all yourself. · If you have hemorrhoids or swelling or pain around the opening of your vagina, try using cold and heat. You can put ice or a cold pack on the area for 10 to 20 minutes at a time. Put a thin cloth between the ice and your skin. Also try sitting in a few inches of warm water (sitz bath) 3 times a day and after bowel movements. · Take pain medicines exactly as directed. ? If the doctor gave you a prescription medicine for pain, take it as prescribed. ? If you are not taking a prescription pain medicine, ask your doctor if you can take an over-the-counter medicine. · Eat more fiber to avoid constipation. Include foods such as whole-grain breads and cereals, raw vegetables, raw and dried fruits, and beans. · Drink plenty of fluids. If you have kidney, heart, or liver disease and have to limit fluids, talk with your doctor before you increase the amount of fluids you drink. · Do not rinse inside your vagina with fluids (douche). · If you have stitches, keep the area clean by pouring or spraying warm water over the area outside your vagina and anus after you use the toilet. · Keep a list of questions to ask your doctor or midwife. Your questions might be about:  ? Changes in your breasts, such as lumps or soreness. ? When to expect your menstrual period to start again. ? What form of birth control is best for you. ? Weight you have put on during the pregnancy. ? Exercise options. ? What foods and drinks are best for you, especially if you are breastfeeding. ? Problems you might be having with breastfeeding. ? When you can have sex. You may want to talk about lubricants for your vagina. ? Any feelings of sadness or restlessness that you are having. When should you call for help? Call 911  anytime you think you may need emergency care.  For example, call if:    · You have thoughts of harming yourself, your baby, or another person.     · You passed out (lost consciousness).     · You have chest pain, are short of breath, or cough up blood.     · You have a seizure. Call your doctor now or seek immediate medical care if:    · Your vaginal bleeding seems to be getting heavier.     · You are dizzy or lightheaded, or you feel like you may faint.     · You have a fever.     · You have new or more belly pain.     · You have symptoms of a blood clot in your leg (called a deep vein thrombosis), such as:  ? Pain in the calf, back of the knee, thigh, or groin. ? Redness and swelling in your leg or groin.     · You have signs of preeclampsia, such as:  ? Sudden swelling of your face, hands, or feet. ? New vision problems (such as dimness, blurring, or seeing spots). ? A severe headache. Watch closely for changes in your health, and be sure to contact your doctor if:    · You have new or worse vaginal discharge.     · You feel sad or depressed.     · You are having problems with your breasts or breastfeeding. Where can you learn more? Go to http://www.gray.com/  Enter B269 in the search box to learn more about \"Postpartum: Care Instructions. \"  Current as of: June 16, 2021               Content Version: 13.0  © 5536-3868 [employer]. Care instructions adapted under license by [employer] (which disclaims liability or warranty for this information). If you have questions about a medical condition or this instruction, always ask your healthcare professional. [place] [address] any warranty or liability for your use of this information. Postpartum Support Groups (virtual)  We know that all of us are dealing with a tremendous amount of uncertainty, confusion and disruption to our daily lives, which may result in increased anxiety, depression and fear. If you are feeling unsettled or worse, please know that we are here to help.  During this time of increased caution and care for one another, [facility] [place] ([address] [place] [address]) is offering virtual support groups to ALL MOTHERS in [place] regardless of the age of your child/children as a way to help weather this emotional storm together. Social support is an important part of self-care during this time of physical distancing. Virtual postpartum support group meetings available at www. postpartumva.org  Warm Line: 241.789.9518    Breastfeeding Support Groups (virtual)  1st and 3rd Wednesday of each month  2nd and 4th Tuesday of each month    [place] at www.[employer] under the \"About Us\" and \"Classes and Events tabs\"

## 2021-12-03 NOTE — LACTATION NOTE
This note was copied from a baby's chart. Mom hoping for discharge with baby this afternoon. Mom states baby is nursing well and has improved throughout post partum stay, deep latch maintained, mother is comfortable, milk is in transition, baby feeding vigorously with rhythmic suck, swallow, breathe pattern, with audible swallowing, and evident milk transfer, both breasts offered, baby is asleep following feeding. Baby is feeding on demand. [de-identified] bili is 10.4 in the high intermediate risk zone will al follow up bili this afternoon at   1 pm.    We reviewed cluster feeding. Frequent feeding during the brief behavioral phase preceeding milk transition is called cluster feeding. Typical  behavior: baby becomes vigorous at the breast and wants to feed frequently- every 1-2 hours for several feedings. Emptying of the breast twice produces double in subsequent feedings. This is the normal process by which the baby demands his/her supply. This type of frequent feeding is the stimulation which causes lactogenesis II (milk coming in). Mom states baby has been cluster feeding. We discussed engorgement. Breasts may become engorged when milk \"comes in\". How milk is made / normal phases of milk production, supply and demand discussed. Taught care of engorged breasts - frequent breastfeeding encouraged. Mom should put the baby to the breast and allow him to completely finish one breast before offering the second breast. She may pump a couple minutes after nursing for comfort. She can apply ice to the breasts for 10-15 minutes after nursing as needed. Pumping and returning to work/school discussed:  Start pumping for storage after first 2-3 weeks- about one hour after first AM feeding when supply is most abundant, once a day to start, timing of pumping at work/school, storage options and guidelines, and clean private pumping location (never in the bathroom).      Breast feeding teaching completed and all questions answered.

## 2021-12-03 NOTE — PROGRESS NOTES
Post-Partum Day Number 2 Progress Note    Cheryl Post     Assessment: Doing well, post partum day 2    Plan:   - Discharge home today  - Follow up in office in 6 week(s) with [facility].  - Pain medication prescription(s) sent. - Questions answered. Information for the patient's :  Teresa Smith [[id number]]   Vaginal, Spontaneous    Patient doing well without significant complaint. Voiding without difficulty, normal lochia. Ready for discharge home. Vitals:  Visit Vitals  /70   Pulse 83   Temp 98.1 °F (36.7 °C)   Resp 16   Ht 5' 5\" (1.651 m)   Wt 120.7 kg (266 lb)   SpO2 96%   Breastfeeding Unknown   BMI 44.26 kg/m²     Temp (24hrs), Av.3 °F (36.8 °C), Min:97.5 °F (36.4 °C), Max:98.7 °F (37.1 °C)      Exam:        Patient without distress. Fundus firm, nontender per nursing fundal checks                Perineum with normal lochia noted per nursing assessment                Lower extremities are negative for pathological edema    Labs:     Lab Results   Component Value Date/Time    WBC 13.6 (H) 2021 06:14 PM    HGB 13.3 2021 06:14 PM    HCT 40.3 2021 06:14 PM    PLATELET 472 (L)  06:14 PM       No results found for this or any previous visit (from the past 24 hour(s)).

## 2021-12-03 NOTE — PROGRESS NOTES
Bedside and Verbal shift change report given to Paty Shepherd RN (oncoming nurse) by Bev Florian RN (offgoing nurse). Report included the following information SBAR.

## 2021-12-03 NOTE — DISCHARGE SUMMARY
Obstetrical Discharge Summary     Name: Patito Liu MRN: [id number]  SSN: [id number]    YOB: 1990  Age: 32 y.o. Sex: female      Admit Date: 2021    Discharge Date: 12/3/2021     Admitting Physician: Roma Knight MD     Attending Physician:  Rinku Hamlin MD     Admission Diagnoses: Uterine contractions during pregnancy [O47.9]    Discharge Diagnoses:   Information for the patient's :  Andrés Ramirez [[id number]]   Delivery of a 3.7 kg female infant via Vaginal, Spontaneous on 2021 at 10:19 AM  by Lydia Bowser. Apgars were 9  and 9 . Additional Diagnoses:   Hospital Problems  Date Reviewed: 2021          Codes Class Noted POA    Uterine contractions during pregnancy ICD-10-CM: O47.9  ICD-9-CM: 644.10  2021 Unknown             Lab Results   Component Value Date/Time    Rubella, External NON-Immune 2021 12:00 AM    GrBStrep, External Negative 2021 12:00 AM       Hospital Course: Normal hospital course following the delivery. Patient Instructions:   Current Discharge Medication List      START taking these medications    Details   ibuprofen (MOTRIN) 600 mg tablet Take 1 Tablet by mouth every six (6) hours as needed for Pain. Qty: 30 Tablet, Refills: 0         CONTINUE these medications which have NOT CHANGED    Details   prenatal vit-iron fumarate-fa 27 mg iron- 0.8 mg tab tablet Take 1 Tablet by mouth daily. STOP taking these medications       pyridoxine, vitamin B6, (Vitamin B-6) 25 mg tablet Comments:   Reason for Stopping:               Disposition at Discharge: Home or self care    Condition at Discharge: Stable    Reference my discharge instructions.     Follow-up Appointments   Procedures    FOLLOW UP VISIT Appointment in: 6 Weeks     Standing Status:   Standing     Number of Occurrences:   1     Order Specific Question:   Appointment in     Answer:   6 Weeks        Signed By:  Cheri Greene MD     December 3, 2021

## 2022-03-19 PROBLEM — O47.9 UTERINE CONTRACTIONS DURING PREGNANCY: Status: ACTIVE | Noted: 2021-11-30

## 2023-05-19 RX ORDER — IBUPROFEN 600 MG/1
600 TABLET ORAL EVERY 6 HOURS PRN
COMMUNITY
Start: 2021-12-02